# Patient Record
Sex: FEMALE | Race: WHITE | NOT HISPANIC OR LATINO | Employment: STUDENT | URBAN - METROPOLITAN AREA
[De-identification: names, ages, dates, MRNs, and addresses within clinical notes are randomized per-mention and may not be internally consistent; named-entity substitution may affect disease eponyms.]

---
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
## 2022-08-05 ENCOUNTER — OFFICE VISIT (OUTPATIENT)
Dept: INTERNAL MEDICINE | Facility: CLINIC | Age: 22
End: 2022-08-05
Payer: COMMERCIAL

## 2022-08-05 VITALS
WEIGHT: 165.56 LBS | SYSTOLIC BLOOD PRESSURE: 114 MMHG | HEART RATE: 82 BPM | OXYGEN SATURATION: 97 % | DIASTOLIC BLOOD PRESSURE: 74 MMHG | BODY MASS INDEX: 22.43 KG/M2 | HEIGHT: 72 IN

## 2022-08-05 DIAGNOSIS — F90.9 ATTENTION DEFICIT HYPERACTIVITY DISORDER (ADHD), UNSPECIFIED ADHD TYPE: ICD-10-CM

## 2022-08-05 DIAGNOSIS — Z00.00 ANNUAL PHYSICAL EXAM: Primary | ICD-10-CM

## 2022-08-05 DIAGNOSIS — K21.9 GASTROESOPHAGEAL REFLUX DISEASE, UNSPECIFIED WHETHER ESOPHAGITIS PRESENT: ICD-10-CM

## 2022-08-05 DIAGNOSIS — F32.5 MAJOR DEPRESSIVE DISORDER WITH SINGLE EPISODE, IN REMISSION: ICD-10-CM

## 2022-08-05 DIAGNOSIS — Z30.430 ENCOUNTER FOR INSERTION OF INTRAUTERINE CONTRACEPTIVE DEVICE (IUD): ICD-10-CM

## 2022-08-05 PROBLEM — F32.9 MAJOR DEPRESSIVE DISORDER, SINGLE EPISODE, UNSPECIFIED: Status: ACTIVE | Noted: 2021-07-03

## 2022-08-05 PROBLEM — F41.9 ANXIETY DISORDER, UNSPECIFIED: Status: ACTIVE | Noted: 2021-07-03

## 2022-08-05 PROCEDURE — 99385 PR PREVENTIVE VISIT,NEW,18-39: ICD-10-PCS | Mod: S$GLB,,, | Performed by: NURSE PRACTITIONER

## 2022-08-05 PROCEDURE — 1160F RVW MEDS BY RX/DR IN RCRD: CPT | Mod: CPTII,S$GLB,, | Performed by: NURSE PRACTITIONER

## 2022-08-05 PROCEDURE — 3078F DIAST BP <80 MM HG: CPT | Mod: CPTII,S$GLB,, | Performed by: NURSE PRACTITIONER

## 2022-08-05 PROCEDURE — 3074F SYST BP LT 130 MM HG: CPT | Mod: CPTII,S$GLB,, | Performed by: NURSE PRACTITIONER

## 2022-08-05 PROCEDURE — 3078F PR MOST RECENT DIASTOLIC BLOOD PRESSURE < 80 MM HG: ICD-10-PCS | Mod: CPTII,S$GLB,, | Performed by: NURSE PRACTITIONER

## 2022-08-05 PROCEDURE — 3074F PR MOST RECENT SYSTOLIC BLOOD PRESSURE < 130 MM HG: ICD-10-PCS | Mod: CPTII,S$GLB,, | Performed by: NURSE PRACTITIONER

## 2022-08-05 PROCEDURE — 99385 PREV VISIT NEW AGE 18-39: CPT | Mod: S$GLB,,, | Performed by: NURSE PRACTITIONER

## 2022-08-05 PROCEDURE — 1159F MED LIST DOCD IN RCRD: CPT | Mod: CPTII,S$GLB,, | Performed by: NURSE PRACTITIONER

## 2022-08-05 PROCEDURE — 3008F BODY MASS INDEX DOCD: CPT | Mod: CPTII,S$GLB,, | Performed by: NURSE PRACTITIONER

## 2022-08-05 PROCEDURE — 99999 PR PBB SHADOW E&M-EST. PATIENT-LVL IV: CPT | Mod: PBBFAC,,, | Performed by: NURSE PRACTITIONER

## 2022-08-05 PROCEDURE — 99999 PR PBB SHADOW E&M-EST. PATIENT-LVL IV: ICD-10-PCS | Mod: PBBFAC,,, | Performed by: NURSE PRACTITIONER

## 2022-08-05 PROCEDURE — 1160F PR REVIEW ALL MEDS BY PRESCRIBER/CLIN PHARMACIST DOCUMENTED: ICD-10-PCS | Mod: CPTII,S$GLB,, | Performed by: NURSE PRACTITIONER

## 2022-08-05 PROCEDURE — 3008F PR BODY MASS INDEX (BMI) DOCUMENTED: ICD-10-PCS | Mod: CPTII,S$GLB,, | Performed by: NURSE PRACTITIONER

## 2022-08-05 PROCEDURE — 1159F PR MEDICATION LIST DOCUMENTED IN MEDICAL RECORD: ICD-10-PCS | Mod: CPTII,S$GLB,, | Performed by: NURSE PRACTITIONER

## 2022-08-05 NOTE — PATIENT INSTRUCTIONS
I recommend the following Obstetrician and Gynecologists:  Dr. Charbonnet Dr. Schexnaildre Dr. VanWormer Dr. Jeansonne   Please call [phone] to scheduled an appointment

## 2022-08-05 NOTE — PROGRESS NOTES
Internal Medicine Annual Exam       CHIEF COMPLAINT     The patient, Bibiana Suero, who is a 21 y.o. female presents for an annual exam.    HPI   Here for annual and to establish care     Reflux- x 6 months with nasal burning and epigastric burning. Keeps her from sleeping. No melanea or blood in the stool.   No weight loss without trying.   Treating with omeprazole and tums with relief.     ADHD- not on medication currently     Anxiety and depression- treated with Lexapro antidepressant, not currently taking.      covid - presumptive positive 1/1/2022    -  covid- 5/23 and 6/13 - booster upcoming   tdap- UTD   Flu-utd  HPV- utd     Past Medical History:  Past Medical History:   Diagnosis Date    ADHD (attention deficit hyperactivity disorder)     Allergy     Anxiety     Depression        Past Surgical History:   Procedure Laterality Date    TONSILLECTOMY          Family History   Problem Relation Age of Onset    Gestational diabetes Mother     Hypertension Maternal Grandmother     Diabetes Maternal Grandmother     Heart disease Paternal Grandmother     Heart failure Paternal Grandmother         Social History     Socioeconomic History    Marital status: Single   Tobacco Use    Smoking status: Never Smoker    Smokeless tobacco: Never Used   Substance and Sexual Activity    Alcohol use: Yes     Alcohol/week: 1.0 standard drink     Types: 1 Glasses of wine per week     Comment: few times per year    Drug use: Not Currently     Types: Marijuana, Psilocybin     Comment: rarely    Sexual activity: Not Currently     Partners: Female, Male     Birth control/protection: Abstinence   Social History Narrative    Lives with mom and step dad. 1 dog and a bearded dragon. Just added a cat.  [employer]- going to be in 12th grade        Social History     Tobacco Use   Smoking Status Never Smoker   Smokeless Tobacco Never Used        Allergies as of 08/05/2022 - Reviewed 08/05/2022   Allergen Reaction Noted     Adhesive Blisters and Dermatitis 11/20/2021          Home Medications:  Prior to Admission medications    Not on File       Review of Systems:  Review of Systems   Constitutional: Positive for activity change (increased exercise ). Negative for appetite change, chills, fatigue and fever.   HENT: Negative for congestion, rhinorrhea, sinus pressure, sinus pain and sore throat.    Eyes: Negative for visual disturbance.   Respiratory: Negative for cough, shortness of breath and wheezing.    Cardiovascular: Negative for chest pain, palpitations and leg swelling.   Gastrointestinal: Negative for abdominal pain, blood in stool, constipation, diarrhea, nausea and vomiting.        GERD - see HPI      Genitourinary: Negative for difficulty urinating, dysuria, menstrual problem and urgency.   Musculoskeletal: Positive for arthralgias (joint pain when not exercises ) and myalgias (soreness in the muscle from exercising ). Negative for gait problem and joint swelling.   Skin: Negative for rash.   Neurological: Negative for dizziness, light-headedness and headaches.   Psychiatric/Behavioral: Positive for decreased concentration (has ADHD - but can focus on projects such as croceting ). Negative for sleep disturbance.       Health Maintainence:   Immunizations:  Health Maintenance       Date Due Completion Date    Hepatitis C Screening Never done ---    Lipid Panel Never done ---    HIV Screening Never done ---    TETANUS VACCINE Never done ---    COVID-19 Vaccine (3 - Booster for Pfizer series) 11/13/2021 6/13/2021    Influenza Vaccine (1) 09/01/2022 11/6/2021           PHYSICAL EXAM     /74 (BP Location: Right arm, Patient Position: Sitting, BP Method: Medium (Manual))   Pulse 82   Ht 6' (1.829 m)   Wt 75.1 kg (165 lb 9.1 oz)   LMP 08/02/2022 (Exact Date)   SpO2 97%   BMI 22.45 kg/m²  Body mass index is 22.45 kg/m².    Physical Exam  Vitals reviewed.   Constitutional:       Appearance: She is well-developed.   HENT:       Head: Normocephalic.      Right Ear: External ear normal.      Left Ear: External ear normal.      Nose: Nose normal.      Mouth/Throat:      Pharynx: No oropharyngeal exudate.   Eyes:      Pupils: Pupils are equal, round, and reactive to light.   Neck:      Thyroid: No thyromegaly.      Vascular: No JVD.      Trachea: No tracheal deviation.   Cardiovascular:      Rate and Rhythm: Normal rate and regular rhythm.      Heart sounds: Normal heart sounds. No murmur heard.    No friction rub. No gallop.   Pulmonary:      Effort: Pulmonary effort is normal. No respiratory distress.      Breath sounds: Normal breath sounds. No wheezing or rales.   Abdominal:      General: Bowel sounds are normal. There is no distension.      Palpations: Abdomen is soft.      Tenderness: There is no abdominal tenderness.   Musculoskeletal:         General: No tenderness. Normal range of motion.      Cervical back: Neck supple.   Lymphadenopathy:      Cervical: No cervical adenopathy.   Skin:     General: Skin is warm and dry.      Findings: No rash.   Neurological:      Mental Status: She is alert and oriented to person, place, and time.   Psychiatric:         Behavior: Behavior normal.         LABS     No results found for: LABA1C, HGBA1C  CMP  Sodium   Date Value Ref Range Status   01/13/2012 139 136 - 145 mmol/L Final     Potassium   Date Value Ref Range Status   01/13/2012 4.2 3.5 - 5.1 mmol/L Final     Chloride   Date Value Ref Range Status   01/13/2012 104 95 - 110 mmol/L Final     CO2   Date Value Ref Range Status   01/13/2012 24 23.0 - 29.0 mmol/L Final     Glucose   Date Value Ref Range Status   01/13/2012 109 70 - 110 mg/dl Final     BUN   Date Value Ref Range Status   01/13/2012 14 5 - 18 mg/dl Final     Creatinine   Date Value Ref Range Status   01/13/2012 0.5 0.5 - 1.4 mg/dl Final     Calcium   Date Value Ref Range Status   01/13/2012 9.7 8.7 - 10.5 mg/dl Final     Total Protein   Date Value Ref Range Status   01/13/2012  7.0 6.0 - 8.4 g/dL Final     Albumin   Date Value Ref Range Status   01/13/2012 4.8 (H) 3.2 - 4.7 g/dl Final     Total Bilirubin   Date Value Ref Range Status   01/13/2012 0.4 0.1 - 1.0 mg/dl Final     Comment:     For infants and newborns, interpretation of results should be based  on gestational age, weight and in agreement with clinical  observations.  .  Premature Infant recommended reference ranges:  Up to 24 hours.............<8.0 mg/dl  Up to 48 hours............<12.0 mg/dl  3-5 days..................<15.0 mg/dl  6-29 days.................<15.0 mg/dl     Alkaline Phosphatase   Date Value Ref Range Status   01/13/2012 280 86 - 315 U/L Final     AST   Date Value Ref Range Status   01/13/2012 18 10 - 40 U/L Final     ALT   Date Value Ref Range Status   01/13/2012 13 10 - 44 U/L Final     Anion Gap   Date Value Ref Range Status   01/13/2012 11 8 - 16 mmol/L Final     eGFR if    Date Value Ref Range Status   01/13/2012 >60 >60 mL/min Final     Comment:     Estimated glomerular filtration rate (eGFR) is normalized to an  average body surface area of 1.73 square meters.  The calculation  used to obtain the eGFR is the adjusted MDRD equation, which factors  patient sex, age, race, and creatinine result.  Since race is unknown  in our information system, the eGFR values for -American  and Non--American patients are given for each creatinine  result.     eGFR if non    Date Value Ref Range Status   01/13/2012 >60 >60 mL/min Final     No results found for: WBC, HGB, HCT, MCV, PLT  No results found for: CHOL  No results found for: HDL  No results found for: LDLCALC  No results found for: TRIG  No results found for: CHOLHDL  No results found for: TSH, P2EDRSL, Z0EPIEW, THYROIDAB    ASSESSMENT/PLAN     Bibiana Suero is a 21 y.o. female    Annual physical exam- All age and gender related screenings discussed   -     CBC Auto Differential; Future; Expected date: 08/05/2022  -      Comprehensive Metabolic Panel; Future; Expected date: 08/05/2022  -     Lipid Panel; Future; Expected date: 08/05/2022  -     TSH; Future; Expected date: 08/05/2022  -     Vitamin D; Future; Expected date: 08/05/2022  -     H. PYLORI ANTIBODY, IGG; Future; Expected date: 08/05/2022    Attention deficit hyperactivity disorder (ADHD), unspecified ADHD type- stable. Will monitor.     Major depressive disorder with single episode, in remission- stable. Will monitor     Gastroesophageal reflux disease, unspecified whether esophagitis present- discussed dietary changes. Check h. Pylori. Cont PPI    -     H. PYLORI ANTIBODY, IGG; Future; Expected date: 08/05/2022    Encounter for insertion of intrauterine contraceptive device (IUD)  -     Ambulatory referral/consult to Obstetrics / Gynecology; Future; Expected date: 08/12/2022    Follow up with PCP    Sheila BONILLA, JOSEFINA, FNP-c   Department of Internal Medicine - [facility]  10:13 AM

## 2022-08-06 ENCOUNTER — LAB VISIT (OUTPATIENT)
Dept: LAB | Facility: HOSPITAL | Age: 22
End: 2022-08-06
Payer: COMMERCIAL

## 2022-08-06 DIAGNOSIS — K21.9 GASTROESOPHAGEAL REFLUX DISEASE, UNSPECIFIED WHETHER ESOPHAGITIS PRESENT: ICD-10-CM

## 2022-08-06 DIAGNOSIS — Z00.00 ANNUAL PHYSICAL EXAM: ICD-10-CM

## 2022-08-06 LAB
25(OH)D3+25(OH)D2 SERPL-MCNC: 16 NG/ML (ref 30–96)
ALBUMIN SERPL BCP-MCNC: 4.1 G/DL (ref 3.5–5.2)
ALP SERPL-CCNC: 62 U/L (ref 55–135)
ALT SERPL W/O P-5'-P-CCNC: 10 U/L (ref 10–44)
ANION GAP SERPL CALC-SCNC: 10 MMOL/L (ref 8–16)
AST SERPL-CCNC: 13 U/L (ref 10–40)
BASOPHILS # BLD AUTO: 0.05 K/UL (ref 0–0.2)
BASOPHILS NFR BLD: 0.8 % (ref 0–1.9)
BILIRUB SERPL-MCNC: 0.5 MG/DL (ref 0.1–1)
BUN SERPL-MCNC: 10 MG/DL (ref 6–20)
CALCIUM SERPL-MCNC: 9.3 MG/DL (ref 8.7–10.5)
CHLORIDE SERPL-SCNC: 105 MMOL/L (ref 95–110)
CHOLEST SERPL-MCNC: 185 MG/DL (ref 120–199)
CHOLEST/HDLC SERPL: 3.9 {RATIO} (ref 2–5)
CO2 SERPL-SCNC: 24 MMOL/L (ref 23–29)
CREAT SERPL-MCNC: 0.7 MG/DL (ref 0.5–1.4)
DIFFERENTIAL METHOD: ABNORMAL
EOSINOPHIL # BLD AUTO: 0.3 K/UL (ref 0–0.5)
EOSINOPHIL NFR BLD: 4.2 % (ref 0–8)
ERYTHROCYTE [DISTWIDTH] IN BLOOD BY AUTOMATED COUNT: 11.6 % (ref 11.5–14.5)
EST. GFR  (NO RACE VARIABLE): >60 ML/MIN/1.73 M^2
GLUCOSE SERPL-MCNC: 100 MG/DL (ref 70–110)
HCT VFR BLD AUTO: 39.2 % (ref 37–48.5)
HDLC SERPL-MCNC: 47 MG/DL (ref 40–75)
HDLC SERPL: 25.4 % (ref 20–50)
HGB BLD-MCNC: 13.4 G/DL (ref 12–16)
IMM GRANULOCYTES # BLD AUTO: 0.01 K/UL (ref 0–0.04)
IMM GRANULOCYTES NFR BLD AUTO: 0.2 % (ref 0–0.5)
LDLC SERPL CALC-MCNC: 124.6 MG/DL (ref 63–159)
LYMPHOCYTES # BLD AUTO: 2.8 K/UL (ref 1–4.8)
LYMPHOCYTES NFR BLD: 44.9 % (ref 18–48)
MCH RBC QN AUTO: 32 PG (ref 27–31)
MCHC RBC AUTO-ENTMCNC: 34.2 G/DL (ref 32–36)
MCV RBC AUTO: 94 FL (ref 82–98)
MONOCYTES # BLD AUTO: 0.5 K/UL (ref 0.3–1)
MONOCYTES NFR BLD: 8.2 % (ref 4–15)
NEUTROPHILS # BLD AUTO: 2.6 K/UL (ref 1.8–7.7)
NEUTROPHILS NFR BLD: 41.7 % (ref 38–73)
NONHDLC SERPL-MCNC: 138 MG/DL
NRBC BLD-RTO: 0 /100 WBC
PLATELET # BLD AUTO: 269 K/UL (ref 150–450)
PMV BLD AUTO: 9.2 FL (ref 9.2–12.9)
POTASSIUM SERPL-SCNC: 3.9 MMOL/L (ref 3.5–5.1)
PROT SERPL-MCNC: 6.9 G/DL (ref 6–8.4)
RBC # BLD AUTO: 4.19 M/UL (ref 4–5.4)
SODIUM SERPL-SCNC: 139 MMOL/L (ref 136–145)
TRIGL SERPL-MCNC: 67 MG/DL (ref 30–150)
TSH SERPL DL<=0.005 MIU/L-ACNC: 1.47 UIU/ML (ref 0.4–4)
WBC # BLD AUTO: 6.13 K/UL (ref 3.9–12.7)

## 2022-08-06 PROCEDURE — 85025 COMPLETE CBC W/AUTO DIFF WBC: CPT | Performed by: NURSE PRACTITIONER

## 2022-08-06 PROCEDURE — 80061 LIPID PANEL: CPT | Performed by: NURSE PRACTITIONER

## 2022-08-06 PROCEDURE — 36415 COLL VENOUS BLD VENIPUNCTURE: CPT | Performed by: NURSE PRACTITIONER

## 2022-08-06 PROCEDURE — 80053 COMPREHEN METABOLIC PANEL: CPT | Performed by: NURSE PRACTITIONER

## 2022-08-06 PROCEDURE — 82306 VITAMIN D 25 HYDROXY: CPT | Performed by: NURSE PRACTITIONER

## 2022-08-06 PROCEDURE — 84443 ASSAY THYROID STIM HORMONE: CPT | Performed by: NURSE PRACTITIONER

## 2022-08-06 PROCEDURE — 86677 HELICOBACTER PYLORI ANTIBODY: CPT | Performed by: NURSE PRACTITIONER

## 2022-08-09 LAB — H PYLORI IGG SERPL QL IA: NEGATIVE

## 2022-08-09 RX ORDER — ERGOCALCIFEROL 1.25 MG/1
50000 CAPSULE ORAL
Qty: 12 CAPSULE | Refills: 0 | Status: SHIPPED | OUTPATIENT
Start: 2022-08-09 | End: 2022-10-27

## 2022-10-05 ENCOUNTER — LAB VISIT (OUTPATIENT)
Dept: LAB | Facility: HOSPITAL | Age: 22
End: 2022-10-05
Attending: STUDENT IN AN ORGANIZED HEALTH CARE EDUCATION/TRAINING PROGRAM
Payer: COMMERCIAL

## 2022-10-05 ENCOUNTER — OFFICE VISIT (OUTPATIENT)
Dept: OBSTETRICS AND GYNECOLOGY | Facility: CLINIC | Age: 22
End: 2022-10-05
Payer: COMMERCIAL

## 2022-10-05 VITALS
WEIGHT: 163.13 LBS | DIASTOLIC BLOOD PRESSURE: 73 MMHG | BODY MASS INDEX: 22.13 KG/M2 | SYSTOLIC BLOOD PRESSURE: 115 MMHG

## 2022-10-05 DIAGNOSIS — Z11.3 ROUTINE SCREENING FOR STI (SEXUALLY TRANSMITTED INFECTION): ICD-10-CM

## 2022-10-05 DIAGNOSIS — Z01.419 WELL WOMAN EXAM WITH ROUTINE GYNECOLOGICAL EXAM: Primary | ICD-10-CM

## 2022-10-05 LAB
HBV SURFACE AG SERPL QL IA: NORMAL
HCV AB SERPL QL IA: NORMAL
HIV 1+2 AB+HIV1 P24 AG SERPL QL IA: NORMAL

## 2022-10-05 PROCEDURE — 1159F MED LIST DOCD IN RCRD: CPT | Mod: CPTII,S$GLB,, | Performed by: STUDENT IN AN ORGANIZED HEALTH CARE EDUCATION/TRAINING PROGRAM

## 2022-10-05 PROCEDURE — 86803 HEPATITIS C AB TEST: CPT | Performed by: STUDENT IN AN ORGANIZED HEALTH CARE EDUCATION/TRAINING PROGRAM

## 2022-10-05 PROCEDURE — 3078F PR MOST RECENT DIASTOLIC BLOOD PRESSURE < 80 MM HG: ICD-10-PCS | Mod: CPTII,S$GLB,, | Performed by: STUDENT IN AN ORGANIZED HEALTH CARE EDUCATION/TRAINING PROGRAM

## 2022-10-05 PROCEDURE — 81514 NFCT DS BV&VAGINITIS DNA ALG: CPT | Performed by: STUDENT IN AN ORGANIZED HEALTH CARE EDUCATION/TRAINING PROGRAM

## 2022-10-05 PROCEDURE — 87389 HIV-1 AG W/HIV-1&-2 AB AG IA: CPT | Performed by: STUDENT IN AN ORGANIZED HEALTH CARE EDUCATION/TRAINING PROGRAM

## 2022-10-05 PROCEDURE — 87491 CHLMYD TRACH DNA AMP PROBE: CPT | Performed by: STUDENT IN AN ORGANIZED HEALTH CARE EDUCATION/TRAINING PROGRAM

## 2022-10-05 PROCEDURE — 1159F PR MEDICATION LIST DOCUMENTED IN MEDICAL RECORD: ICD-10-PCS | Mod: CPTII,S$GLB,, | Performed by: STUDENT IN AN ORGANIZED HEALTH CARE EDUCATION/TRAINING PROGRAM

## 2022-10-05 PROCEDURE — 36415 COLL VENOUS BLD VENIPUNCTURE: CPT | Performed by: STUDENT IN AN ORGANIZED HEALTH CARE EDUCATION/TRAINING PROGRAM

## 2022-10-05 PROCEDURE — 3078F DIAST BP <80 MM HG: CPT | Mod: CPTII,S$GLB,, | Performed by: STUDENT IN AN ORGANIZED HEALTH CARE EDUCATION/TRAINING PROGRAM

## 2022-10-05 PROCEDURE — 99385 PR PREVENTIVE VISIT,NEW,18-39: ICD-10-PCS | Mod: S$GLB,,, | Performed by: STUDENT IN AN ORGANIZED HEALTH CARE EDUCATION/TRAINING PROGRAM

## 2022-10-05 PROCEDURE — 99999 PR PBB SHADOW E&M-EST. PATIENT-LVL III: ICD-10-PCS | Mod: PBBFAC,,, | Performed by: STUDENT IN AN ORGANIZED HEALTH CARE EDUCATION/TRAINING PROGRAM

## 2022-10-05 PROCEDURE — 3074F PR MOST RECENT SYSTOLIC BLOOD PRESSURE < 130 MM HG: ICD-10-PCS | Mod: CPTII,S$GLB,, | Performed by: STUDENT IN AN ORGANIZED HEALTH CARE EDUCATION/TRAINING PROGRAM

## 2022-10-05 PROCEDURE — 87340 HEPATITIS B SURFACE AG IA: CPT | Performed by: STUDENT IN AN ORGANIZED HEALTH CARE EDUCATION/TRAINING PROGRAM

## 2022-10-05 PROCEDURE — 87591 N.GONORRHOEAE DNA AMP PROB: CPT | Performed by: STUDENT IN AN ORGANIZED HEALTH CARE EDUCATION/TRAINING PROGRAM

## 2022-10-05 PROCEDURE — 86592 SYPHILIS TEST NON-TREP QUAL: CPT | Performed by: STUDENT IN AN ORGANIZED HEALTH CARE EDUCATION/TRAINING PROGRAM

## 2022-10-05 PROCEDURE — 3074F SYST BP LT 130 MM HG: CPT | Mod: CPTII,S$GLB,, | Performed by: STUDENT IN AN ORGANIZED HEALTH CARE EDUCATION/TRAINING PROGRAM

## 2022-10-05 PROCEDURE — 99999 PR PBB SHADOW E&M-EST. PATIENT-LVL III: CPT | Mod: PBBFAC,,, | Performed by: STUDENT IN AN ORGANIZED HEALTH CARE EDUCATION/TRAINING PROGRAM

## 2022-10-05 PROCEDURE — 3008F PR BODY MASS INDEX (BMI) DOCUMENTED: ICD-10-PCS | Mod: CPTII,S$GLB,, | Performed by: STUDENT IN AN ORGANIZED HEALTH CARE EDUCATION/TRAINING PROGRAM

## 2022-10-05 PROCEDURE — 99385 PREV VISIT NEW AGE 18-39: CPT | Mod: S$GLB,,, | Performed by: STUDENT IN AN ORGANIZED HEALTH CARE EDUCATION/TRAINING PROGRAM

## 2022-10-05 PROCEDURE — 3008F BODY MASS INDEX DOCD: CPT | Mod: CPTII,S$GLB,, | Performed by: STUDENT IN AN ORGANIZED HEALTH CARE EDUCATION/TRAINING PROGRAM

## 2022-10-05 NOTE — PROGRESS NOTES
"CC: Well woman exam    HPI:  Bibiana Suero is a 22 y.o. female  presents for a well woman exam.  She has no issues, problems, or complaints. Patient reports they are transgender, goes by "Glory" and pronouns are all - does not mind she/he/they. Patient is going to have a new sexual partner and desires STI testing to be sure status before engaging in sexual activity. Interested in breast reduction in future.        Patient history:   Past Medical History:   Diagnosis Date    ADHD (attention deficit hyperactivity disorder)     Allergy     Anxiety     Depression     Other infectious mononucleosis without complication      Past Surgical History:   Procedure Laterality Date    TONSILLECTOMY       OB History    Para Term  AB Living   0 0 0 0 0 0   SAB IAB Ectopic Multiple Live Births   0 0 0 0 0       GYN  Menopausal: No  History of abnormal paps: N/A  Abnormal or postmenopausal bleeding: DENIES  History of abnormal mammograms:N/A   Family history of breast or ovarian cancer: DENIES  Any breast masses, pain, skin changes, or nipple discharge: DENIES  Possible recent STD exposure: denies   Contraception: None    Pap: No result found, <21  Mammogram: N/A      Family History   Problem Relation Age of Onset    Gestational diabetes Mother     Hypertension Maternal Grandmother     Diabetes Maternal Grandmother     Heart disease Paternal Grandmother     Heart failure Paternal Grandmother      Social History     Tobacco Use    Smoking status: Never    Smokeless tobacco: Never   Substance Use Topics    Alcohol use: Yes     Alcohol/week: 1.0 standard drink     Types: 1 Glasses of wine per week     Comment: few times per year    Drug use: Not Currently     Types: Marijuana, Psilocybin     Comment: rarely     Allergies: Adhesive    Current Outpatient Medications:     ergocalciferol (ERGOCALCIFEROL) 50,000 unit Cap, Take 1 capsule (50,000 Units total) by mouth every 7 days., Disp: 12 capsule, Rfl: 0 "       ROS:  GENERAL: Denies weight gain or weight loss. Feeling well overall.   SKIN: Denies rash or lesions.   HEAD: Denies head injury or headache.   NODES: Denies enlarged lymph nodes.   CHEST: Denies chest pain or shortness of breath.   CARDIOVASCULAR: Denies palpitations or left sided chest pain.   ABDOMEN: No abdominal pain, constipation, diarrhea, nausea, vomiting or rectal bleeding.   URINARY: No frequency, dysuria, hematuria, or burning on urination.  REPRODUCTIVE: See HPI.   BREASTS: The patient performs breast self-examination and denies pain, lumps, or nipple discharge.   HEMATOLOGIC: No easy bruisability or excessive bleeding.  MUSCULOSKELETAL: Denies joint pain or swelling.   NEUROLOGIC: Denies syncope or weakness.   PSYCHIATRIC: Denies depression, anxiety or mood swings.    Objective:   /73   Wt 74 kg (163 lb 2.3 oz)   LMP 09/26/2022 (Exact Date)   BMI 22.13 kg/m²       Physical Exam:  APPEARANCE: Well nourished, well developed, in no acute distress.  AFFECT: WNL, alert and oriented x 3  SKIN: No acne or hirsutism  NECK: Neck symmetric without masses or thyromegaly  NODES: No inguinal, cervical, axillary, or femoral lymph node enlargement  CHEST: Good respiratory effect  ABDOMEN: Soft.  No tenderness or masses.  No hepatosplenomegaly.  No hernias.  BREASTS: Symmetrical, no skin changes or visible lesions.  No palpable masses, nipple discharge bilaterally.  PELVIC: Normal external genitalia without lesions.  Normal hair distribution.  Adequate perineal body, normal urethral meatus.  Vagina moist and well rugated without lesions or discharge.  Cervix pink, without lesions, discharge or tenderness.  No significant cystocele or rectocele.    EXTREMITIES: No edema.    ASSESSMENT AND PLAN  1. Well woman exam with routine gynecological exam  Device Authorization Order    Ambulatory referral/consult to Plastic Surgery      2. Routine screening for STI (sexually transmitted infection)  C.  trachomatis/N. gonorrhoeae by AMP DNA [facility]; Vagina    Hepatitis B Surface Antigen    Hepatitis C Antibody    HIV 1/2 Ag/Ab (4th Gen)    RPR    Vaginosis Screen by DNA Probe          Annual exam  Breast and pelvic exam: wnl  Patient counseled on ASCCP guidelines for cervical cytology screening  Cervical screening: at 21  HPV vaccine: completed 2013-15  Patient counseled on current recommendations for breast cancer screening  Mammogram screening: at 40  STD testing: completed today per patient request  Contraception: patient desires copper IUD, has done own research. Reviewed risks/benefits today, patient confirms desire for placement. IUD device ordered, will schedule placement once approved.  Osteoporosis screening at 65  Tobacco cessation counseling n/a  Referral to plastic surgery for more information regarding future breast reduction     She was counseled to follow up with her PCP for other routine health maintenance      Follow up for for IUD insertion .      Hanna Monique MD  OBGYN [facility]

## 2022-10-06 LAB — RPR SER QL: NORMAL

## 2022-10-07 LAB
BACTERIAL VAGINOSIS DNA: POSITIVE
C TRACH DNA SPEC QL NAA+PROBE: NOT DETECTED
CANDIDA GLABRATA DNA: NEGATIVE
CANDIDA KRUSEI DNA: NEGATIVE
CANDIDA RRNA VAG QL PROBE: NEGATIVE
N GONORRHOEA DNA SPEC QL NAA+PROBE: NOT DETECTED
T VAGINALIS RRNA GENITAL QL PROBE: NEGATIVE

## 2022-10-08 ENCOUNTER — PATIENT MESSAGE (OUTPATIENT)
Dept: OBSTETRICS AND GYNECOLOGY | Facility: CLINIC | Age: 22
End: 2022-10-08
Payer: COMMERCIAL

## 2022-10-10 DIAGNOSIS — B96.89 BV (BACTERIAL VAGINOSIS): Primary | ICD-10-CM

## 2022-10-10 DIAGNOSIS — N76.0 BV (BACTERIAL VAGINOSIS): Primary | ICD-10-CM

## 2022-10-10 RX ORDER — METRONIDAZOLE 500 MG/1
500 TABLET ORAL EVERY 12 HOURS
Qty: 14 TABLET | Refills: 0 | Status: SHIPPED | OUTPATIENT
Start: 2022-10-10 | End: 2022-10-17

## 2022-10-28 ENCOUNTER — IMMUNIZATION (OUTPATIENT)
Dept: INTERNAL MEDICINE | Facility: CLINIC | Age: 22
End: 2022-10-28
Payer: COMMERCIAL

## 2022-10-28 DIAGNOSIS — Z23 NEED FOR VACCINATION: Primary | ICD-10-CM

## 2022-10-28 PROCEDURE — 91312 COVID-19, MRNA, LNP-S, BIVALENT BOOSTER, PF, 30 MCG/0.3 ML DOSE: CPT | Mod: S$GLB,,, | Performed by: INTERNAL MEDICINE

## 2022-10-28 PROCEDURE — 91312 COVID-19, MRNA, LNP-S, BIVALENT BOOSTER, PF, 30 MCG/0.3 ML DOSE: ICD-10-PCS | Mod: S$GLB,,, | Performed by: INTERNAL MEDICINE

## 2022-10-28 PROCEDURE — 0124A COVID-19, MRNA, LNP-S, BIVALENT BOOSTER, PF, 30 MCG/0.3 ML DOSE: CPT | Mod: CV19,PBBFAC | Performed by: INTERNAL MEDICINE

## 2022-11-05 ENCOUNTER — IMMUNIZATION (OUTPATIENT)
Dept: INTERNAL MEDICINE | Facility: CLINIC | Age: 22
End: 2022-11-05
Payer: COMMERCIAL

## 2022-11-05 PROCEDURE — 90471 FLU VACCINE (QUAD) GREATER THAN OR EQUAL TO 3YO PRESERVATIVE FREE IM: ICD-10-PCS | Mod: S$GLB,,, | Performed by: INTERNAL MEDICINE

## 2022-11-05 PROCEDURE — 90471 IMMUNIZATION ADMIN: CPT | Mod: S$GLB,,, | Performed by: INTERNAL MEDICINE

## 2022-11-05 PROCEDURE — 90686 FLU VACCINE (QUAD) GREATER THAN OR EQUAL TO 3YO PRESERVATIVE FREE IM: ICD-10-PCS | Mod: S$GLB,,, | Performed by: INTERNAL MEDICINE

## 2022-11-05 PROCEDURE — 90686 IIV4 VACC NO PRSV 0.5 ML IM: CPT | Mod: S$GLB,,, | Performed by: INTERNAL MEDICINE

## 2022-11-17 ENCOUNTER — OFFICE VISIT (OUTPATIENT)
Dept: PLASTIC SURGERY | Facility: CLINIC | Age: 22
End: 2022-11-17
Payer: COMMERCIAL

## 2022-11-17 VITALS
DIASTOLIC BLOOD PRESSURE: 63 MMHG | HEIGHT: 72 IN | OXYGEN SATURATION: 98 % | WEIGHT: 169.75 LBS | BODY MASS INDEX: 22.99 KG/M2 | SYSTOLIC BLOOD PRESSURE: 105 MMHG | HEART RATE: 92 BPM

## 2022-11-17 DIAGNOSIS — F64.9 GENDER DYSPHORIA: ICD-10-CM

## 2022-11-17 DIAGNOSIS — G89.29 CHRONIC BILATERAL THORACIC BACK PAIN: ICD-10-CM

## 2022-11-17 DIAGNOSIS — M54.6 CHRONIC BILATERAL THORACIC BACK PAIN: ICD-10-CM

## 2022-11-17 DIAGNOSIS — N62 MACROMASTIA: Primary | ICD-10-CM

## 2022-11-17 PROCEDURE — 1159F MED LIST DOCD IN RCRD: CPT | Mod: CPTII,S$GLB,, | Performed by: SURGERY

## 2022-11-17 PROCEDURE — 3074F PR MOST RECENT SYSTOLIC BLOOD PRESSURE < 130 MM HG: ICD-10-PCS | Mod: CPTII,S$GLB,, | Performed by: SURGERY

## 2022-11-17 PROCEDURE — 3008F PR BODY MASS INDEX (BMI) DOCUMENTED: ICD-10-PCS | Mod: CPTII,S$GLB,, | Performed by: SURGERY

## 2022-11-17 PROCEDURE — 99999 PR PBB SHADOW E&M-EST. PATIENT-LVL III: CPT | Mod: PBBFAC,,, | Performed by: SURGERY

## 2022-11-17 PROCEDURE — 3078F PR MOST RECENT DIASTOLIC BLOOD PRESSURE < 80 MM HG: ICD-10-PCS | Mod: CPTII,S$GLB,, | Performed by: SURGERY

## 2022-11-17 PROCEDURE — 99999 PR PBB SHADOW E&M-EST. PATIENT-LVL III: ICD-10-PCS | Mod: PBBFAC,,, | Performed by: SURGERY

## 2022-11-17 PROCEDURE — 1159F PR MEDICATION LIST DOCUMENTED IN MEDICAL RECORD: ICD-10-PCS | Mod: CPTII,S$GLB,, | Performed by: SURGERY

## 2022-11-17 PROCEDURE — 3078F DIAST BP <80 MM HG: CPT | Mod: CPTII,S$GLB,, | Performed by: SURGERY

## 2022-11-17 PROCEDURE — 3074F SYST BP LT 130 MM HG: CPT | Mod: CPTII,S$GLB,, | Performed by: SURGERY

## 2022-11-17 PROCEDURE — 99204 PR OFFICE/OUTPT VISIT, NEW, LEVL IV, 45-59 MIN: ICD-10-PCS | Mod: S$GLB,,, | Performed by: SURGERY

## 2022-11-17 PROCEDURE — 3008F BODY MASS INDEX DOCD: CPT | Mod: CPTII,S$GLB,, | Performed by: SURGERY

## 2022-11-17 PROCEDURE — 99204 OFFICE O/P NEW MOD 45 MIN: CPT | Mod: S$GLB,,, | Performed by: SURGERY

## 2022-11-20 PROBLEM — G89.29 CHRONIC BILATERAL THORACIC BACK PAIN: Status: ACTIVE | Noted: 2022-11-20

## 2022-11-20 PROBLEM — F64.9 GENDER DYSPHORIA: Status: ACTIVE | Noted: 2022-11-20

## 2022-11-20 PROBLEM — N62 MACROMASTIA: Status: ACTIVE | Noted: 2022-11-20

## 2022-11-20 PROBLEM — M54.6 CHRONIC BILATERAL THORACIC BACK PAIN: Status: ACTIVE | Noted: 2022-11-20

## 2022-11-20 NOTE — H&P (VIEW-ONLY)
"Plastic Surgery History & Physical    SUBJECTIVE:   Chief complaint: large breasts, pain    History of Present Illness:  Patient is a 22 y.o. female presents with symptomatic bilateral large pendulous  breasts. She is 34DDD. States that she has back and neck pain for greater than 5 years.  Her pain is also worse during her menstrual cycles when her breasts swell.  Takes :"tylenol NSAIDS for this. She has not participated in physical therapy and exercise program to relieve back and neck pain. Complaints of shoulder grooving from queta straps and rashes under the breast She is active.  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  She is a nonsmoker, does not use any nicotine products.  Past medical history includes vitamin-D deficiency, ADHD  She identifies as non binary.  She knows that she is not present very masculine and she is fluid with her pronouns.  She currently does not bind her chest because her breasts are too large and is uncomfortable.  She does think if her breasts are smaller she would be more likely to bind.  She is considered mastectomy for [facility] top surgery, though she is not sure if she is ready to have her breasts removed., and depression.    Past Medical History:   Diagnosis Date    ADHD (attention deficit hyperactivity disorder)     Allergy     Anxiety     Depression     Other infectious mononucleosis without complication        Past Surgical History:   Procedure Laterality Date    TONSILLECTOMY         Family History   Problem Relation Age of Onset    Gestational diabetes Mother     Hypertension Maternal Grandmother     Diabetes Maternal Grandmother     Heart disease Paternal Grandmother     Heart failure Paternal Grandmother        Social History     Socioeconomic History    Marital status: Single   Tobacco Use    Smoking status: Never    Smokeless tobacco: Never   Substance and Sexual Activity    Alcohol use: Yes     Alcohol/week: 1.0 standard drink     " Types: 1 Glasses of wine per week     Comment: few times per year    Drug use: Not Currently     Types: Marijuana, Psilocybin     Comment: rarely    Sexual activity: Not Currently     Partners: Female, Male     Birth control/protection: Abstinence   Social History Narrative    Lives with mom and step dad. 1 dog and a bearded dragon. Just added a cat.  [employer]- going to be in 12th grade       Current Outpatient Medications   Medication Sig Dispense Refill    ergocalciferol (ERGOCALCIFEROL) 50,000 unit Cap TAKE 1 CAPSULE BY MOUTH ONE TIME PER WEEK 12 capsule 0     No current facility-administered medications for this visit.       Review of patient's allergies indicates:   Allergen Reactions    Adhesive Blisters and Dermatitis         Review of Systems:    Review of Systems   HENT: Positive for neck pain.    Musculoskeletal: Positive for back pain.         OBJECTIVE:     /63 (BP Location: Left arm, Patient Position: Sitting, BP Method: Medium (Automatic))   Pulse 92   Ht 6' (1.829 m)   Wt 77 kg (169 lb 12.1 oz)   LMP 10/24/2022   SpO2 98%   BMI 23.02 kg/m²     Physical Exam:  Gen: NAD, appears stated age  Neuro: normal without focal findings, mental status and speech normal  HEENT: NCAT, neck supple, PEERL  CV: RRR  Pulm: Breathing non-labored, chest wall movement equal bilaterally   Breast: Large heavy breasts, Grade 1 Ptosis  Abdomen: soft, nontender, no guarding  Gu: genitalia not examined  Extremity:normal strength, no cyanosis or edema  Skin: Skin color, texture, turgor normal. No rashes or lesions  Psych: oriented to time, place and person, mood and affect are within normal limits       Last MMG NA    ASSESSMENT/PLAN:     Macromastia  Patient is a good candidate for bilateral breast reduction.  I expect an approximate 400gm reduction per side.  Discussed general guidelines for insurance approval for breast reductions.     Discussed the procedure in detail including: using a pedicle of breast  tissue to support blood supply to the nipple, the use of Wise pattern skin flaps to reduce size and support the pedicle, risks of skin loss, fat necrosis, partial or complete nipple loss, delayed wound healing, normal scarring, outpatient surgery, general recovery and the use of drains. Also discussed that breasts may be bruised or swollen afterwards and won't have their final appearance for 3-6 months after surgery. Discussed slightly reduced chance of successful breast feeding.    All questions answered.    Will send for photos and submit for insurance approval      Chronic bilateral thoracic back pain  See macromastia      Gender dysphoria  See macromastia  Will bind if her breasts are smaller      Leodan Loyd, DO  Plastic and Reconstructive Surgery

## 2022-11-20 NOTE — PROGRESS NOTES
"Plastic Surgery History & Physical    SUBJECTIVE:   Chief complaint: large breasts, pain    History of Present Illness:  Patient is a 22 y.o. female presents with symptomatic bilateral large pendulous  breasts. She is 34DDD. States that she has back and neck pain for greater than 5 years.  Her pain is also worse during her menstrual cycles when her breasts swell.  Takes :"tylenol NSAIDS for this. She has not participated in physical therapy and exercise program to relieve back and neck pain. Complaints of shoulder grooving from queta straps and rashes under the breast She is active.  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  She is a nonsmoker, does not use any nicotine products.  Past medical history includes vitamin-D deficiency, ADHD  She identifies as non binary.  She knows that she is not present very masculine and she is fluid with her pronouns.  She currently does not bind her chest because her breasts are too large and is uncomfortable.  She does think if her breasts are smaller she would be more likely to bind.  She is considered mastectomy for [facility] top surgery, though she is not sure if she is ready to have her breasts removed., and depression.    Past Medical History:   Diagnosis Date    ADHD (attention deficit hyperactivity disorder)     Allergy     Anxiety     Depression     Other infectious mononucleosis without complication        Past Surgical History:   Procedure Laterality Date    TONSILLECTOMY         Family History   Problem Relation Age of Onset    Gestational diabetes Mother     Hypertension Maternal Grandmother     Diabetes Maternal Grandmother     Heart disease Paternal Grandmother     Heart failure Paternal Grandmother        Social History     Socioeconomic History    Marital status: Single   Tobacco Use    Smoking status: Never    Smokeless tobacco: Never   Substance and Sexual Activity    Alcohol use: Yes     Alcohol/week: 1.0 standard drink     " Types: 1 Glasses of wine per week     Comment: few times per year    Drug use: Not Currently     Types: Marijuana, Psilocybin     Comment: rarely    Sexual activity: Not Currently     Partners: Female, Male     Birth control/protection: Abstinence   Social History Narrative    Lives with mom and step dad. 1 dog and a bearded dragon. Just added a cat.  [employer]- going to be in 12th grade       Current Outpatient Medications   Medication Sig Dispense Refill    ergocalciferol (ERGOCALCIFEROL) 50,000 unit Cap TAKE 1 CAPSULE BY MOUTH ONE TIME PER WEEK 12 capsule 0     No current facility-administered medications for this visit.       Review of patient's allergies indicates:   Allergen Reactions    Adhesive Blisters and Dermatitis         Review of Systems:    Review of Systems   HENT: Positive for neck pain.    Musculoskeletal: Positive for back pain.         OBJECTIVE:     /63 (BP Location: Left arm, Patient Position: Sitting, BP Method: Medium (Automatic))   Pulse 92   Ht 6' (1.829 m)   Wt 77 kg (169 lb 12.1 oz)   LMP 10/24/2022   SpO2 98%   BMI 23.02 kg/m²     Physical Exam:  Gen: NAD, appears stated age  Neuro: normal without focal findings, mental status and speech normal  HEENT: NCAT, neck supple, PEERL  CV: RRR  Pulm: Breathing non-labored, chest wall movement equal bilaterally   Breast: Large heavy breasts, Grade 1 Ptosis  Abdomen: soft, nontender, no guarding  Gu: genitalia not examined  Extremity:normal strength, no cyanosis or edema  Skin: Skin color, texture, turgor normal. No rashes or lesions  Psych: oriented to time, place and person, mood and affect are within normal limits       Last MMG NA    ASSESSMENT/PLAN:     Macromastia  Patient is a good candidate for bilateral breast reduction.  I expect an approximate 400gm reduction per side.  Discussed general guidelines for insurance approval for breast reductions.     Discussed the procedure in detail including: using a pedicle of breast  tissue to support blood supply to the nipple, the use of Wise pattern skin flaps to reduce size and support the pedicle, risks of skin loss, fat necrosis, partial or complete nipple loss, delayed wound healing, normal scarring, outpatient surgery, general recovery and the use of drains. Also discussed that breasts may be bruised or swollen afterwards and won't have their final appearance for 3-6 months after surgery. Discussed slightly reduced chance of successful breast feeding.    All questions answered.    Will send for photos and submit for insurance approval      Chronic bilateral thoracic back pain  See macromastia      Gender dysphoria  See macromastia  Will bind if her breasts are smaller      Leodan Loyd, DO  Plastic and Reconstructive Surgery

## 2022-11-20 NOTE — ASSESSMENT & PLAN NOTE
Patient is a good candidate for bilateral breast reduction.  I expect an approximate 400gm reduction per side.  Discussed general guidelines for insurance approval for breast reductions.     Discussed the procedure in detail including: using a pedicle of breast tissue to support blood supply to the nipple, the use of Wise pattern skin flaps to reduce size and support the pedicle, risks of skin loss, fat necrosis, partial or complete nipple loss, delayed wound healing, normal scarring, outpatient surgery, general recovery and the use of drains. Also discussed that breasts may be bruised or swollen afterwards and won't have their final appearance for 3-6 months after surgery. Discussed slightly reduced chance of successful breast feeding.    All questions answered.    Will send for photos and submit for insurance approval

## 2022-12-02 ENCOUNTER — TELEPHONE (OUTPATIENT)
Dept: PLASTIC SURGERY | Facility: CLINIC | Age: 22
End: 2022-12-02
Payer: COMMERCIAL

## 2022-12-02 DIAGNOSIS — N62 MACROMASTIA: Primary | ICD-10-CM

## 2022-12-02 NOTE — TELEPHONE ENCOUNTER
Pt called and confirmed sx date 12/9- advised pt of pre admit /anesthesia appt along with labs to be done. Direct # given to call to make appt- pt agreeable and verbalized understanding. No add'l concerns at this time.

## 2022-12-02 NOTE — TELEPHONE ENCOUNTER
Marcia pt to offer sx dates in December with Dr Loyd. Pt will call by the end of today to let me know. No additional concerns addressed at this time.

## 2022-12-07 ENCOUNTER — HOSPITAL ENCOUNTER (OUTPATIENT)
Dept: PREADMISSION TESTING | Facility: OTHER | Age: 22
Discharge: HOME | End: 2022-12-07
Attending: SURGERY
Payer: COMMERCIAL

## 2022-12-07 ENCOUNTER — ANESTHESIA EVENT (OUTPATIENT)
Dept: SURGERY | Facility: OTHER | Age: 22
End: 2022-12-07
Payer: COMMERCIAL

## 2022-12-07 VITALS
SYSTOLIC BLOOD PRESSURE: 92 MMHG | BODY MASS INDEX: 22.89 KG/M2 | HEART RATE: 67 BPM | WEIGHT: 169 LBS | TEMPERATURE: 98 F | DIASTOLIC BLOOD PRESSURE: 51 MMHG | HEIGHT: 72 IN | OXYGEN SATURATION: 99 % | RESPIRATION RATE: 16 BRPM

## 2022-12-07 DIAGNOSIS — N62 MACROMASTIA: ICD-10-CM

## 2022-12-07 LAB
ALBUMIN SERPL BCP-MCNC: 4.5 G/DL (ref 3.5–5.2)
ALP SERPL-CCNC: 62 U/L (ref 55–135)
ALT SERPL W/O P-5'-P-CCNC: 13 U/L (ref 10–44)
ANION GAP SERPL CALC-SCNC: 8 MMOL/L (ref 8–16)
AST SERPL-CCNC: 11 U/L (ref 10–40)
BASOPHILS # BLD AUTO: 0.05 K/UL (ref 0–0.2)
BASOPHILS NFR BLD: 0.8 % (ref 0–1.9)
BILIRUB SERPL-MCNC: 0.6 MG/DL (ref 0.1–1)
BUN SERPL-MCNC: 11 MG/DL (ref 6–20)
CALCIUM SERPL-MCNC: 9.5 MG/DL (ref 8.7–10.5)
CHLORIDE SERPL-SCNC: 110 MMOL/L (ref 95–110)
CO2 SERPL-SCNC: 22 MMOL/L (ref 23–29)
CREAT SERPL-MCNC: 0.7 MG/DL (ref 0.5–1.4)
DIFFERENTIAL METHOD: ABNORMAL
EOSINOPHIL # BLD AUTO: 0.2 K/UL (ref 0–0.5)
EOSINOPHIL NFR BLD: 3.9 % (ref 0–8)
ERYTHROCYTE [DISTWIDTH] IN BLOOD BY AUTOMATED COUNT: 11.5 % (ref 11.5–14.5)
EST. GFR  (NO RACE VARIABLE): >60 ML/MIN/1.73 M^2
GLUCOSE SERPL-MCNC: 108 MG/DL (ref 70–110)
HCT VFR BLD AUTO: 40.3 % (ref 37–48.5)
HGB BLD-MCNC: 14.3 G/DL (ref 12–16)
IMM GRANULOCYTES # BLD AUTO: 0.02 K/UL (ref 0–0.04)
IMM GRANULOCYTES NFR BLD AUTO: 0.3 % (ref 0–0.5)
LYMPHOCYTES # BLD AUTO: 2.3 K/UL (ref 1–4.8)
LYMPHOCYTES NFR BLD: 39.2 % (ref 18–48)
MCH RBC QN AUTO: 32.9 PG (ref 27–31)
MCHC RBC AUTO-ENTMCNC: 35.5 G/DL (ref 32–36)
MCV RBC AUTO: 93 FL (ref 82–98)
MONOCYTES # BLD AUTO: 0.5 K/UL (ref 0.3–1)
MONOCYTES NFR BLD: 8.4 % (ref 4–15)
NEUTROPHILS # BLD AUTO: 2.8 K/UL (ref 1.8–7.7)
NEUTROPHILS NFR BLD: 47.4 % (ref 38–73)
NRBC BLD-RTO: 0 /100 WBC
PLATELET # BLD AUTO: 246 K/UL (ref 150–450)
PMV BLD AUTO: 9.1 FL (ref 9.2–12.9)
POTASSIUM SERPL-SCNC: 4.2 MMOL/L (ref 3.5–5.1)
PROT SERPL-MCNC: 7.1 G/DL (ref 6–8.4)
RBC # BLD AUTO: 4.35 M/UL (ref 4–5.4)
SODIUM SERPL-SCNC: 140 MMOL/L (ref 136–145)
WBC # BLD AUTO: 5.92 K/UL (ref 3.9–12.7)

## 2022-12-07 PROCEDURE — 85025 COMPLETE CBC W/AUTO DIFF WBC: CPT | Performed by: SURGERY

## 2022-12-07 PROCEDURE — 80053 COMPREHEN METABOLIC PANEL: CPT | Performed by: SURGERY

## 2022-12-07 PROCEDURE — 36415 COLL VENOUS BLD VENIPUNCTURE: CPT | Performed by: SURGERY

## 2022-12-08 ENCOUNTER — TELEPHONE (OUTPATIENT)
Dept: PLASTIC SURGERY | Facility: CLINIC | Age: 22
End: 2022-12-08
Payer: COMMERCIAL

## 2022-12-08 NOTE — TELEPHONE ENCOUNTER
Pre op call completed. Ptadvised  to arrive at 0630 for 0830 surgery At [facility] location 1st floor Pre admit- NPO verified along with ride and caregiver. Pt verbalized understanding and states no  concerns at this time.

## 2022-12-09 ENCOUNTER — HOSPITAL ENCOUNTER (OUTPATIENT)
Facility: OTHER | Age: 22
Discharge: HOME | End: 2022-12-09
Attending: SURGERY | Admitting: SURGERY
Payer: COMMERCIAL

## 2022-12-09 ENCOUNTER — ANESTHESIA (OUTPATIENT)
Dept: SURGERY | Facility: OTHER | Age: 22
End: 2022-12-09
Payer: COMMERCIAL

## 2022-12-09 VITALS
WEIGHT: 169 LBS | DIASTOLIC BLOOD PRESSURE: 66 MMHG | SYSTOLIC BLOOD PRESSURE: 122 MMHG | BODY MASS INDEX: 22.92 KG/M2 | HEART RATE: 88 BPM | TEMPERATURE: 98 F | OXYGEN SATURATION: 99 % | RESPIRATION RATE: 16 BRPM

## 2022-12-09 DIAGNOSIS — N62 MACROMASTIA: Primary | ICD-10-CM

## 2022-12-09 LAB
B-HCG UR QL: NEGATIVE
CTP QC/QA: YES

## 2022-12-09 PROCEDURE — 37000009 HC ANESTHESIA EA ADD 15 MINS: Performed by: SURGERY

## 2022-12-09 PROCEDURE — 81025 URINE PREGNANCY TEST: CPT | Performed by: SURGERY

## 2022-12-09 PROCEDURE — 63600175 PHARM REV CODE 636 W HCPCS: Performed by: SURGERY

## 2022-12-09 PROCEDURE — 25000003 PHARM REV CODE 250: Performed by: SURGERY

## 2022-12-09 PROCEDURE — 25000003 PHARM REV CODE 250: Performed by: ANESTHESIOLOGY

## 2022-12-09 PROCEDURE — 25000003 PHARM REV CODE 250: Performed by: NURSE ANESTHETIST, CERTIFIED REGISTERED

## 2022-12-09 PROCEDURE — 36000706: Performed by: SURGERY

## 2022-12-09 PROCEDURE — 19318 PR REDUCTION OF LARGE BREAST: ICD-10-PCS | Mod: 50,,, | Performed by: SURGERY

## 2022-12-09 PROCEDURE — P9045 ALBUMIN (HUMAN), 5%, 250 ML: HCPCS | Mod: JG | Performed by: NURSE ANESTHETIST, CERTIFIED REGISTERED

## 2022-12-09 PROCEDURE — 71000016 HC POSTOP RECOV ADDL HR: Performed by: SURGERY

## 2022-12-09 PROCEDURE — 63600175 PHARM REV CODE 636 W HCPCS: Performed by: NURSE ANESTHETIST, CERTIFIED REGISTERED

## 2022-12-09 PROCEDURE — 88305 TISSUE EXAM BY PATHOLOGIST: CPT | Mod: 26,,, | Performed by: PATHOLOGY

## 2022-12-09 PROCEDURE — 19318 BREAST REDUCTION: CPT | Mod: 50,,, | Performed by: SURGERY

## 2022-12-09 PROCEDURE — 71000015 HC POSTOP RECOV 1ST HR: Performed by: SURGERY

## 2022-12-09 PROCEDURE — 63600175 PHARM REV CODE 636 W HCPCS: Performed by: ANESTHESIOLOGY

## 2022-12-09 PROCEDURE — 88305 TISSUE EXAM BY PATHOLOGIST: ICD-10-PCS | Mod: 26,,, | Performed by: PATHOLOGY

## 2022-12-09 PROCEDURE — 37000008 HC ANESTHESIA 1ST 15 MINUTES: Performed by: SURGERY

## 2022-12-09 PROCEDURE — 71000039 HC RECOVERY, EACH ADD'L HOUR: Performed by: SURGERY

## 2022-12-09 PROCEDURE — 36000707: Performed by: SURGERY

## 2022-12-09 PROCEDURE — 88305 TISSUE EXAM BY PATHOLOGIST: CPT | Mod: 59 | Performed by: PATHOLOGY

## 2022-12-09 PROCEDURE — 71000033 HC RECOVERY, INTIAL HOUR: Performed by: SURGERY

## 2022-12-09 RX ORDER — SODIUM CHLORIDE 0.9 % (FLUSH) 0.9 %
3 SYRINGE (ML) INJECTION
Status: DISCONTINUED | OUTPATIENT
Start: 2022-12-09 | End: 2022-12-09 | Stop reason: HOSPADM

## 2022-12-09 RX ORDER — PHENYLEPHRINE HYDROCHLORIDE 10 MG/ML
INJECTION INTRAVENOUS
Status: DISCONTINUED | OUTPATIENT
Start: 2022-12-09 | End: 2022-12-09

## 2022-12-09 RX ORDER — KETAMINE HCL IN 0.9 % NACL 50 MG/5 ML
SYRINGE (ML) INTRAVENOUS
Status: DISCONTINUED | OUTPATIENT
Start: 2022-12-09 | End: 2022-12-09

## 2022-12-09 RX ORDER — CEFAZOLIN SODIUM 1 G/3ML
2 INJECTION, POWDER, FOR SOLUTION INTRAMUSCULAR; INTRAVENOUS
Status: COMPLETED | OUTPATIENT
Start: 2022-12-09 | End: 2022-12-09

## 2022-12-09 RX ORDER — DEXAMETHASONE SODIUM PHOSPHATE 4 MG/ML
INJECTION, SOLUTION INTRA-ARTICULAR; INTRALESIONAL; INTRAMUSCULAR; INTRAVENOUS; SOFT TISSUE
Status: DISCONTINUED | OUTPATIENT
Start: 2022-12-09 | End: 2022-12-09

## 2022-12-09 RX ORDER — ONDANSETRON 8 MG/1
8 TABLET, ORALLY DISINTEGRATING ORAL ONCE
Status: COMPLETED | OUTPATIENT
Start: 2022-12-09 | End: 2022-12-09

## 2022-12-09 RX ORDER — GABAPENTIN 300 MG/1
300 CAPSULE ORAL 3 TIMES DAILY
Qty: 15 CAPSULE | Refills: 0 | Status: SHIPPED | OUTPATIENT
Start: 2022-12-09 | End: 2024-02-22

## 2022-12-09 RX ORDER — MEPERIDINE HYDROCHLORIDE 25 MG/ML
12.5 INJECTION INTRAMUSCULAR; INTRAVENOUS; SUBCUTANEOUS ONCE AS NEEDED
Status: DISCONTINUED | OUTPATIENT
Start: 2022-12-09 | End: 2022-12-09 | Stop reason: HOSPADM

## 2022-12-09 RX ORDER — HYDROMORPHONE HYDROCHLORIDE 2 MG/ML
INJECTION, SOLUTION INTRAMUSCULAR; INTRAVENOUS; SUBCUTANEOUS
Status: DISCONTINUED | OUTPATIENT
Start: 2022-12-09 | End: 2022-12-09

## 2022-12-09 RX ORDER — IBUPROFEN 800 MG/1
800 TABLET ORAL 3 TIMES DAILY
Start: 2022-12-09

## 2022-12-09 RX ORDER — MIDAZOLAM HYDROCHLORIDE 1 MG/ML
INJECTION INTRAMUSCULAR; INTRAVENOUS
Status: DISCONTINUED | OUTPATIENT
Start: 2022-12-09 | End: 2022-12-09

## 2022-12-09 RX ORDER — SODIUM CHLORIDE 0.9 % (FLUSH) 0.9 %
10 SYRINGE (ML) INJECTION
Status: DISCONTINUED | OUTPATIENT
Start: 2022-12-09 | End: 2022-12-09 | Stop reason: HOSPADM

## 2022-12-09 RX ORDER — HYDROMORPHONE HYDROCHLORIDE 2 MG/ML
0.4 INJECTION, SOLUTION INTRAMUSCULAR; INTRAVENOUS; SUBCUTANEOUS EVERY 5 MIN PRN
Status: DISCONTINUED | OUTPATIENT
Start: 2022-12-09 | End: 2022-12-09 | Stop reason: HOSPADM

## 2022-12-09 RX ORDER — ONDANSETRON 2 MG/ML
INJECTION INTRAMUSCULAR; INTRAVENOUS
Status: DISCONTINUED | OUTPATIENT
Start: 2022-12-09 | End: 2022-12-09

## 2022-12-09 RX ORDER — OXYCODONE HYDROCHLORIDE 5 MG/1
5 TABLET ORAL ONCE
Status: COMPLETED | OUTPATIENT
Start: 2022-12-09 | End: 2022-12-09

## 2022-12-09 RX ORDER — OXYCODONE HYDROCHLORIDE 5 MG/1
5 TABLET ORAL
Status: DISCONTINUED | OUTPATIENT
Start: 2022-12-09 | End: 2022-12-09 | Stop reason: HOSPADM

## 2022-12-09 RX ORDER — PROPOFOL 10 MG/ML
VIAL (ML) INTRAVENOUS
Status: DISCONTINUED | OUTPATIENT
Start: 2022-12-09 | End: 2022-12-09

## 2022-12-09 RX ORDER — ACETAMINOPHEN 325 MG/1
975 TABLET ORAL
Status: COMPLETED | OUTPATIENT
Start: 2022-12-09 | End: 2022-12-09

## 2022-12-09 RX ORDER — FENTANYL CITRATE 50 UG/ML
INJECTION, SOLUTION INTRAMUSCULAR; INTRAVENOUS
Status: DISCONTINUED | OUTPATIENT
Start: 2022-12-09 | End: 2022-12-09

## 2022-12-09 RX ORDER — PROCHLORPERAZINE EDISYLATE 5 MG/ML
5 INJECTION INTRAMUSCULAR; INTRAVENOUS EVERY 30 MIN PRN
Status: DISCONTINUED | OUTPATIENT
Start: 2022-12-09 | End: 2022-12-09 | Stop reason: HOSPADM

## 2022-12-09 RX ORDER — ALBUMIN HUMAN 50 G/1000ML
SOLUTION INTRAVENOUS CONTINUOUS PRN
Status: DISCONTINUED | OUTPATIENT
Start: 2022-12-09 | End: 2022-12-09

## 2022-12-09 RX ORDER — DIPHENHYDRAMINE HYDROCHLORIDE 50 MG/ML
25 INJECTION INTRAMUSCULAR; INTRAVENOUS EVERY 6 HOURS PRN
Status: DISCONTINUED | OUTPATIENT
Start: 2022-12-09 | End: 2022-12-09 | Stop reason: HOSPADM

## 2022-12-09 RX ORDER — OXYCODONE HYDROCHLORIDE 5 MG/1
5 TABLET ORAL EVERY 4 HOURS PRN
Qty: 10 TABLET | Refills: 0 | Status: SHIPPED | OUTPATIENT
Start: 2022-12-09 | End: 2024-02-22

## 2022-12-09 RX ORDER — ACETAMINOPHEN 500 MG
1000 TABLET ORAL EVERY 8 HOURS
Refills: 0
Start: 2022-12-09

## 2022-12-09 RX ORDER — LIDOCAINE HYDROCHLORIDE 10 MG/ML
1 INJECTION, SOLUTION EPIDURAL; INFILTRATION; INTRACAUDAL; PERINEURAL ONCE
Status: DISCONTINUED | OUTPATIENT
Start: 2022-12-09 | End: 2022-12-09 | Stop reason: HOSPADM

## 2022-12-09 RX ORDER — ROCURONIUM BROMIDE 10 MG/ML
INJECTION, SOLUTION INTRAVENOUS
Status: DISCONTINUED | OUTPATIENT
Start: 2022-12-09 | End: 2022-12-09

## 2022-12-09 RX ORDER — LIDOCAINE HCL/PF 100 MG/5ML
SYRINGE (ML) INTRAVENOUS
Status: DISCONTINUED | OUTPATIENT
Start: 2022-12-09 | End: 2022-12-09

## 2022-12-09 RX ADMIN — HYDROMORPHONE HYDROCHLORIDE 0.5 MG: 2 INJECTION INTRAMUSCULAR; INTRAVENOUS; SUBCUTANEOUS at 12:12

## 2022-12-09 RX ADMIN — ROCURONIUM BROMIDE 20 MG: 10 SOLUTION INTRAVENOUS at 09:12

## 2022-12-09 RX ADMIN — SODIUM CHLORIDE, SODIUM LACTATE, POTASSIUM CHLORIDE, AND CALCIUM CHLORIDE: .6; .31; .03; .02 INJECTION, SOLUTION INTRAVENOUS at 11:12

## 2022-12-09 RX ADMIN — HYDROMORPHONE HYDROCHLORIDE 0.4 MG: 2 INJECTION INTRAMUSCULAR; INTRAVENOUS; SUBCUTANEOUS at 01:12

## 2022-12-09 RX ADMIN — ACETAMINOPHEN 975 MG: 325 TABLET, FILM COATED ORAL at 07:12

## 2022-12-09 RX ADMIN — HYDROMORPHONE HYDROCHLORIDE 0.5 MG: 2 INJECTION INTRAMUSCULAR; INTRAVENOUS; SUBCUTANEOUS at 09:12

## 2022-12-09 RX ADMIN — PHENYLEPHRINE HYDROCHLORIDE 100 MCG: 10 INJECTION INTRAVENOUS at 10:12

## 2022-12-09 RX ADMIN — FENTANYL CITRATE 100 MCG: 50 INJECTION, SOLUTION INTRAMUSCULAR; INTRAVENOUS at 08:12

## 2022-12-09 RX ADMIN — HYDROMORPHONE HYDROCHLORIDE 0.5 MG: 2 INJECTION INTRAMUSCULAR; INTRAVENOUS; SUBCUTANEOUS at 11:12

## 2022-12-09 RX ADMIN — SODIUM CHLORIDE, SODIUM LACTATE, POTASSIUM CHLORIDE, AND CALCIUM CHLORIDE: .6; .31; .03; .02 INJECTION, SOLUTION INTRAVENOUS at 09:12

## 2022-12-09 RX ADMIN — ROCURONIUM BROMIDE 50 MG: 10 SOLUTION INTRAVENOUS at 08:12

## 2022-12-09 RX ADMIN — HYDROMORPHONE HYDROCHLORIDE 0.4 MG: 2 INJECTION INTRAMUSCULAR; INTRAVENOUS; SUBCUTANEOUS at 12:12

## 2022-12-09 RX ADMIN — FENTANYL CITRATE 50 MCG: 50 INJECTION, SOLUTION INTRAMUSCULAR; INTRAVENOUS at 09:12

## 2022-12-09 RX ADMIN — CEFAZOLIN 2 G: 330 INJECTION, POWDER, FOR SOLUTION INTRAMUSCULAR; INTRAVENOUS at 09:12

## 2022-12-09 RX ADMIN — CARBOXYMETHYLCELLULOSE SODIUM 2 DROP: 2.5 SOLUTION/ DROPS OPHTHALMIC at 08:12

## 2022-12-09 RX ADMIN — Medication 30 MG: at 09:12

## 2022-12-09 RX ADMIN — SUGAMMADEX 160 MG: 100 INJECTION, SOLUTION INTRAVENOUS at 12:12

## 2022-12-09 RX ADMIN — PROPOFOL 200 MG: 10 INJECTION, EMULSION INTRAVENOUS at 08:12

## 2022-12-09 RX ADMIN — SODIUM CHLORIDE, SODIUM LACTATE, POTASSIUM CHLORIDE, AND CALCIUM CHLORIDE: .6; .31; .03; .02 INJECTION, SOLUTION INTRAVENOUS at 07:12

## 2022-12-09 RX ADMIN — ONDANSETRON HYDROCHLORIDE 4 MG: 2 INJECTION INTRAMUSCULAR; INTRAVENOUS at 11:12

## 2022-12-09 RX ADMIN — MIDAZOLAM HYDROCHLORIDE 2 MG: 1 INJECTION, SOLUTION INTRAMUSCULAR; INTRAVENOUS at 08:12

## 2022-12-09 RX ADMIN — OXYCODONE 5 MG: 5 TABLET ORAL at 03:12

## 2022-12-09 RX ADMIN — OXYCODONE 5 MG: 5 TABLET ORAL at 12:12

## 2022-12-09 RX ADMIN — DEXAMETHASONE SODIUM PHOSPHATE 8 MG: 4 INJECTION, SOLUTION INTRAMUSCULAR; INTRAVENOUS at 08:12

## 2022-12-09 RX ADMIN — PHENYLEPHRINE HYDROCHLORIDE 100 MCG: 10 INJECTION INTRAVENOUS at 11:12

## 2022-12-09 RX ADMIN — ROCURONIUM BROMIDE 10 MG: 10 SOLUTION INTRAVENOUS at 10:12

## 2022-12-09 RX ADMIN — ONDANSETRON 8 MG: 8 TABLET, ORALLY DISINTEGRATING ORAL at 03:12

## 2022-12-09 RX ADMIN — ALBUMIN (HUMAN): 12.5 SOLUTION INTRAVENOUS at 09:12

## 2022-12-09 RX ADMIN — LIDOCAINE HYDROCHLORIDE 100 MG: 20 INJECTION, SOLUTION INTRAVENOUS at 08:12

## 2022-12-09 NOTE — OR NURSING
[facility]     Mar/chart reviewed  Follow up AHRF, COVID19 pneumonia  Sedated on vent unable to provide subjective  Did poorly supine, but tolerating prone well    Vital Sign Min/Max for last 24 hours  Temp  Min: 97.4 °F (36.3 °C)  Max: 97.7 °F (36.5 °C)   BP  Min: 91/41  Max: 169/80   Pulse  Min: 51  Max: 98   Resp  Min: 18  Max: 18   SpO2  Min: 83 %  Max: 100 %   No data recorded   Weight  Min: 68 kg (150 lb)  Max: 68 kg (150 lb)   1922/1175  Appears ill, sedated on vent  perrl, eomi, normal sclera  mmm, no jvd, trachea midline, neck supple,  chest cta bilaterally, no crackles, no wheezes,   rrr,   soft, nt, nd +bs,  no c/c/ e  Skin warm, dry no rashes    Labs: 9/7: reviewed:  7.36/55/101 (45%; 18 rr; 8 peep; 450tv)  Glucose 209  Bun 41  Cr 1  Na 149  k 5.1  Bicarb 26  crp 2.5  Wbc 15  hgb 13.8  plts 232  CXR: 9/7: pending    A/P:  1. Acute hypoxemic and hypercapnic respiratory failure -- continue vent support, wean as able. He is really doing pretty well at this point all considering. Would wean fi02 at this point to 40% can try and wean peep now.   can leave supine, if he remains at 40% supine I would stop proning and let paralytic lapse  2. COVID19 pneumonia -- CRP is fairly low, d dimer is low -- would keep the current measures for this in place, monitor for secondary bacterial infection. Check procal tomorrow  D dimer is low, neg ct angio and neg venous doppler, continue prophylactic lovenox  3. DM -- accuechecks ok, continue current  4. Pulmonary hypertension -- no specific therapy for now, repeat echo when better    Doing reasonably well really, hopefully will tolerate supine soon and can minimize sedation as much as possible here.    Discussed with Dr. Webster, dicussed with RN and discussed with RT    CC 35 mins.    Pt requesting additional pain pill for incisional pain 6/10.  Dr. Garcia called.  Order for additional oxy received.

## 2022-12-09 NOTE — ANESTHESIA POSTPROCEDURE EVALUATION
Anesthesia Post Evaluation    Patient: Bibiana Suero    Procedure(s) Performed: Procedure(s) (LRB):  MAMMOPLASTY, REDUCTION (Bilateral)    Final Anesthesia Type: general      Patient location during evaluation: PACU  Patient participation: Yes- Able to Participate  Level of consciousness: awake and alert  Post-procedure vital signs: reviewed and stable  Pain management: adequate  Airway patency: patent    PONV status at discharge: No PONV  Anesthetic complications: no      Cardiovascular status: blood pressure returned to baseline and stable  Respiratory status: room air  Hydration status: euvolemic  Follow-up not needed.          Vitals Value Taken Time   /65 12/09/22 1252   Temp 36.9 °C (98.4 °F) 12/09/22 1235   Pulse 91 12/09/22 1252   Resp 16 12/09/22 1250   SpO2 96 % 12/09/22 1252   Vitals shown include unvalidated device data.      No case tracking events are documented in the log.      Pain/Deloris Score: Pain Rating Prior to Med Admin: 7 (12/9/2022 12:50 PM)

## 2022-12-09 NOTE — PLAN OF CARE
Bibiana Suero has met all discharge criteria from Phase II. Vital Signs are stable, ambulating  without difficulty.Pain is now under control and tolerable for the pt. Pain score 4 at this time. States nausea is subsiding.  Discharge instructions given, patient verbalized understanding. Discharged from facility via wheelchair in stable condition.

## 2022-12-09 NOTE — BRIEF OP NOTE
[facility] ([place])  Brief Operative Note     SUMMARY     Surgery Date: 12/9/2022     Surgeon(s) and Role:     * DO Sal Russell Primary        Pre-op Diagnosis:  Macromastia [N62]    Post-op Diagnosis:  Post-Op Diagnosis Codes:     * Macromastia [N62]    Procedure(s) (LRB):  MAMMOPLASTY, REDUCTION (Bilateral)    Anesthesia: General    Description of the findings of the procedure: wise pattern - superomedial pedicle  Right 486 gm, Left 544 gm    Findings/Key Components: See operative report    Estimated Blood Loss: * No values recorded between 12/9/2022  9:13 AM and 12/9/2022 12:12 PM *         Specimens:   Specimen (24h ago, onward)       Start     Ordered    12/09/22 1056  Specimen to Pathology, Surgery Breast  Once        Comments: Pre-op Diagnosis: Macromastia [N62]Procedure(s):MAMMOPLASTY, REDUCTION Number of specimens: 2Name of specimens: 1. Right breast tissue2. Left breast tissue     References:    Click here for ordering Quick Tip   Question Answer Comment   Procedure Type: Breast    Specimen Class: Routine/Screening    Which provider would you like to cc? AUGUSTIN GOMEZ    Release to patient Immediate        12/09/22 1055                    Implants: * No implants in log *    Complications: None    Discharge Note    SUMMARY     Admit Date: 12/9/2022    Discharge Date and Time:  12/09/2022 12:13 PM    Hospital Course: Patient was placed in observation status for the above procedure.  See above.  Postoperatively was discharged home from the PACU once criteria was met.    Final Diagnosis: Post-Op Diagnosis Codes:     * Macromastia [N62]    Disposition: Home or Self Care    Follow Up/Patient Instructions:     Medications:  Reconciled Home Medications:      Medication List        START taking these medications      acetaminophen 500 MG tablet  Commonly known as: TYLENOL  Take 2 tablets (1,000 mg total) by mouth every 8 (eight) hours.     gabapentin 300 MG capsule  Commonly known as:  NEURONTIN  Take 1 capsule (300 mg total) by mouth 3 (three) times daily. for 5 days     ibuprofen 800 MG tablet  Commonly known as: ADVIL,MOTRIN  Take 1 tablet (800 mg total) by mouth 3 (three) times daily.     oxyCODONE 5 MG immediate release tablet  Commonly known as: ROXICODONE  Take 1 tablet (5 mg total) by mouth every 4 (four) hours as needed for Pain.            CONTINUE taking these medications      ergocalciferol 50,000 unit Cap  Commonly known as: ERGOCALCIFEROL  TAKE 1 CAPSULE BY MOUTH ONE TIME PER WEEK     FIBER ORAL  Take by mouth once daily.     IRON ORAL  Take by mouth once daily.     PROBIOTIC ORAL  Take by mouth once daily.     UNABLE TO FIND  Take by mouth once daily. Melaleuca Vitality            Discharge Procedure Orders   Diet general     Lifting restrictions   Order Comments: Light activity only     Wear Surgical Bra and/or Girdle at all times (may remove for short times to shower)   Order Comments: Wear Surgical Bra and/or Girdle at all times (may remove for short times to shower)

## 2022-12-09 NOTE — TRANSFER OF CARE
Anesthesia Transfer of Care Note    Patient: Bibiana Suero    Procedure(s) Performed: Procedure(s) (LRB):  MAMMOPLASTY, REDUCTION (Bilateral)    Patient location: PACU    Anesthesia Type: general    Transport from OR: Transported from OR on 2-3 L/min O2 by NC with adequate spontaneous ventilation    Post pain: adequate analgesia    Post assessment: no apparent anesthetic complications    Post vital signs: stable    Level of consciousness: sedated and responds to stimulation    Nausea/Vomiting: no nausea/vomiting    Complications: none    Transfer of care protocol was followed      Last vitals:   Visit Vitals  /69 (BP Location: Left arm, Patient Position: Sitting)   Pulse 90   Temp 36.9 °C (98.4 °F) (Temporal)   Resp 16   Wt 76.7 kg (169 lb)   SpO2 98%   Breastfeeding No   BMI 22.92 kg/m²

## 2022-12-09 NOTE — ANESTHESIA PROCEDURE NOTES
Intubation    Date/Time: 12/9/2022 8:53 AM  Performed by: Susanna Gary CRNA  Authorized by: Brandon Garcia MD     Intubation:     Induction:  Intravenous    Intubated:  Postinduction    Mask Ventilation:  Easy mask    Attempts:  1    Attempted By:  CRNA    Method of Intubation:  Video laryngoscopy    Blade:  [place] 3    Laryngeal View Grade: Grade I - full view of cords      Difficult Airway Encountered?: No      Complications:  None    Airway Device:  Oral endotracheal tube    Airway Device Size:  7.0    Tube secured:  22    Secured at:  The lips    Placement Verified By:  Capnometry    Complicating Factors:  None    Findings Post-Intubation:  BS equal bilateral and atraumatic/condition of teeth unchanged

## 2022-12-09 NOTE — PATIENT INSTRUCTIONS
Plastic Surgery Discharge Instructions  Leodan Loyd, DO    Wound Care  1. Shower daily beginning 2 days after surgery  2. Okay to let warm soapy water run over the wound at that time  3. Do not submerge wounds in the bath  4. Leave any skin glue or mesh tape in place    Drain Care  If you have drains, strip tubing and record output when drain bulb becomes half full, or at least twice daily  Record output for each drain and bring to our follow up appointment    Diet  Regular Diet    Activity  Light activity only - no lifting greater than 10 lbs  Avoid strenuous activity that would cause you to get hot or sweaty    Medications  You have been given a prescription for narcotic pain medication, anti-inflammatory pain, muscle relaxer, and nerve pain  Please take medications as prescribed     What to call for:  1. Sustained fever > 101.0  2. Changes in color, sensation, temperature or pain at surgical site  3. Redness and/or drainage from the surgical site  4. Any reaction to prescribed medications  5. Continuous bloody drainage from surgical drains  6. Wound vac malfunction  7. Questions related to the procedure    Follow-up  1. Please call [phone] to schedule or confirm your follow up visit at the [facility] [email or web address] clinic on [facility]  2. Call with any questions or concerns.  2. After hours call [phone] - ask to speak with the Plastic Surgery fellow on call

## 2022-12-12 NOTE — OP NOTE
[facility]  Plastic Surgery  Operative Note    SUMMARY     Date of Procedure: 12/9/2022     Location:  [facility]    Procedure(s): Procedure(s) (LRB):  MAMMOPLASTY, REDUCTION (Bilateral)     Surgeon(s) and Role:     * Allen Loyd DO - Primary    Assistant: Matrin Holloway DO, Fellow    Pre-Operative Diagnosis: Macromastia [N62], gender dysphoria    Post-Operative Diagnosis: same    Anesthesia: General    Indications for Procedure:  This is a 22-year-old.  She is transgender, she has gender dysphoria and is fluid with her pronouns.  She also has large heavy breasts.  This causes discomfort.  She currently does not bind her chest because she feels her breasts are too large.  She is not interested in female to male top surgery at this time.  She wanted her large heavy breasts reduce for symptom relief.  This is approved by her insurance.    Operative Findings (including complications, if any):  Bilateral wise pattern breast reduction, superior medial pedicle   Right breast 486 g, left breast 544 g    Description of Procedures:  Patient was seen in the preoperative holding area.  Consents were signed and all questions were answered.  Breasts were marked for a wise pattern reduction.  The left breast was noted to be larger than the right.    Patient was taken the operating room general anesthesia was induced.  Both breasts were prepped and draped in sterile fashion.  Tumescent solution was injected along the incisions in the plan plans of dissection in both breasts.  I began on the right side.  A 34 mm cookie cutter was used around the areola.  Then incised the superior medial pedicle in the rim of the areola.  Superior medial pedicle was de-epithelialized.  Next all the wise pattern skin incisions were made using a knife.  I began the reduction by incising the tissue along the bottom of the superior medial pedicle straight down the chest wall.  I then removed all of the tissue from medial  to lateral from below medial skin flap to the IMF.  This was removed as 1 large piece.  I did undermine the lateral flap some to remove extra tissue.  After this was done both flaps were undermined to allow them to advance to the IMF.  Also the lateral edge of the superior medial pedicle was cut we could swing into position.  The dermis on the pedicle was incised with a was not tethered.  The wound was checked for hemostasis.  A 2-0 Vicryl was then used at the T point and the breast was temporarily stapled.  The breast was a little larger than the patient wanted.  I then excised some additional lateral tissue and some tissue off of the bottom of the pedicle.  Now another 2-0 Vicryl was placed at the T point of the breast was temporarily stapled.      Now on the left side the 34 mm cookie cutter was used and the breast reduction was performed in a similar fashion using superior medial pedicle.  More breast tissue was removed on the left side as this was her larger breast preoperatively.  Stitch was placed at the T point and the breast was temporarily stapled.  The patient was sat up.  The patient was still a little bit more projecting on the right side.  Otherwise there was good symmetry.  Small lateral dog ears were marked.  The patient was laid back flat.    Both breasts were opened.  They are irrigated with saline.  Small amount of tissue was removed from the right side.  There was good symmetry.  Hemostasis was achieved.  Finally both breasts were closed.  Three 2-0 Vicryl were placed at the T point 2-0 Monocryl was used to close the deep dermis of the horizontal incision.  The vertical incision was closed with interrupted 3-0 Monocryl.  A 34 mm cookie cutter was used and the nipples were positioned 4.5 cm from the bottom of the areola to the IMF.  Fifteen blade was used to cut the skin inset the areolas.  Both nipples were healthy.  The areolas were inset with for deep dermal 3-0 Vicryl, followed by half buried  4-0 Prolene horizontal mattress sutures.  The horizontal subcuticular layer was closed with a running 3-0 Stratafix.  The areola and vertical subcuticular layer was closed with a 4-0 Stratafix.    Patient tolerated procedure well and was taken to recovery in stable condition.  Photos were taken during the case.    Significant Surgical Tasks Conducted by the Assistant(s), if Applicable: na    Estimated Blood Loss (EBL): 100mL           Implants: * No implants in log *    Specimens:   Specimen (24h ago, onward)      None                    Condition: Good    Disposition: PACU - hemodynamically stable., DC home    Attestation: I was present and scrubbed for the entire procedure.

## 2022-12-15 ENCOUNTER — OFFICE VISIT (OUTPATIENT)
Dept: PLASTIC SURGERY | Facility: CLINIC | Age: 22
End: 2022-12-15
Payer: COMMERCIAL

## 2022-12-15 VITALS
SYSTOLIC BLOOD PRESSURE: 110 MMHG | BODY MASS INDEX: 22.89 KG/M2 | WEIGHT: 169 LBS | HEIGHT: 72 IN | DIASTOLIC BLOOD PRESSURE: 63 MMHG | HEART RATE: 70 BPM

## 2022-12-15 DIAGNOSIS — N62 MACROMASTIA: Primary | ICD-10-CM

## 2022-12-15 LAB
FINAL PATHOLOGIC DIAGNOSIS: NORMAL
Lab: NORMAL

## 2022-12-15 PROCEDURE — 3074F SYST BP LT 130 MM HG: CPT | Mod: CPTII,S$GLB,, | Performed by: SURGERY

## 2022-12-15 PROCEDURE — 1159F PR MEDICATION LIST DOCUMENTED IN MEDICAL RECORD: ICD-10-PCS | Mod: CPTII,S$GLB,, | Performed by: SURGERY

## 2022-12-15 PROCEDURE — 1159F MED LIST DOCD IN RCRD: CPT | Mod: CPTII,S$GLB,, | Performed by: SURGERY

## 2022-12-15 PROCEDURE — 3078F PR MOST RECENT DIASTOLIC BLOOD PRESSURE < 80 MM HG: ICD-10-PCS | Mod: CPTII,S$GLB,, | Performed by: SURGERY

## 2022-12-15 PROCEDURE — 99499 UNLISTED E&M SERVICE: CPT | Mod: S$GLB,,, | Performed by: SURGERY

## 2022-12-15 PROCEDURE — 3074F PR MOST RECENT SYSTOLIC BLOOD PRESSURE < 130 MM HG: ICD-10-PCS | Mod: CPTII,S$GLB,, | Performed by: SURGERY

## 2022-12-15 PROCEDURE — 99999 PR PBB SHADOW E&M-EST. PATIENT-LVL III: ICD-10-PCS | Mod: PBBFAC,,, | Performed by: SURGERY

## 2022-12-15 PROCEDURE — 99499 NO LOS: ICD-10-PCS | Mod: S$GLB,,, | Performed by: SURGERY

## 2022-12-15 PROCEDURE — 3008F BODY MASS INDEX DOCD: CPT | Mod: CPTII,S$GLB,, | Performed by: SURGERY

## 2022-12-15 PROCEDURE — 99999 PR PBB SHADOW E&M-EST. PATIENT-LVL III: CPT | Mod: PBBFAC,,, | Performed by: SURGERY

## 2022-12-15 PROCEDURE — 3078F DIAST BP <80 MM HG: CPT | Mod: CPTII,S$GLB,, | Performed by: SURGERY

## 2022-12-15 PROCEDURE — 3008F PR BODY MASS INDEX (BMI) DOCUMENTED: ICD-10-PCS | Mod: CPTII,S$GLB,, | Performed by: SURGERY

## 2022-12-15 NOTE — PROGRESS NOTES
Glopete is 1 week status post bilateral breast reduction.  She is very happy with her size.  She is got she would a breast reduction.      Steri-Strips and Prolene sutures were removed.  Areolas are healing well.  She still has her Prineo tape in place.  There is no evidence of any wound breakdown at the T point   No drains, some resolving ecchymosis and edema    Glory is doing well she is really happy.  I will see her back in 2 weeks.  I gave her instructions to remove or  trim the Prineo tape if it starts coming off sooner.    Leodan Loyd, DO  Plastic and Reconstructive Surgery

## 2022-12-29 ENCOUNTER — OFFICE VISIT (OUTPATIENT)
Dept: PLASTIC SURGERY | Facility: CLINIC | Age: 22
End: 2022-12-29
Payer: COMMERCIAL

## 2022-12-29 VITALS
HEIGHT: 72 IN | BODY MASS INDEX: 22.89 KG/M2 | HEART RATE: 73 BPM | DIASTOLIC BLOOD PRESSURE: 59 MMHG | WEIGHT: 169 LBS | SYSTOLIC BLOOD PRESSURE: 95 MMHG

## 2022-12-29 DIAGNOSIS — F64.9 GENDER DYSPHORIA: Primary | ICD-10-CM

## 2022-12-29 DIAGNOSIS — N62 MACROMASTIA: ICD-10-CM

## 2022-12-29 PROCEDURE — 99499 NO LOS: ICD-10-PCS | Mod: S$GLB,,, | Performed by: SURGERY

## 2022-12-29 PROCEDURE — 3008F BODY MASS INDEX DOCD: CPT | Mod: CPTII,S$GLB,, | Performed by: SURGERY

## 2022-12-29 PROCEDURE — 3078F PR MOST RECENT DIASTOLIC BLOOD PRESSURE < 80 MM HG: ICD-10-PCS | Mod: CPTII,S$GLB,, | Performed by: SURGERY

## 2022-12-29 PROCEDURE — 3074F PR MOST RECENT SYSTOLIC BLOOD PRESSURE < 130 MM HG: ICD-10-PCS | Mod: CPTII,S$GLB,, | Performed by: SURGERY

## 2022-12-29 PROCEDURE — 1159F MED LIST DOCD IN RCRD: CPT | Mod: CPTII,S$GLB,, | Performed by: SURGERY

## 2022-12-29 PROCEDURE — 99999 PR PBB SHADOW E&M-EST. PATIENT-LVL III: CPT | Mod: PBBFAC,,, | Performed by: SURGERY

## 2022-12-29 PROCEDURE — 3078F DIAST BP <80 MM HG: CPT | Mod: CPTII,S$GLB,, | Performed by: SURGERY

## 2022-12-29 PROCEDURE — 3074F SYST BP LT 130 MM HG: CPT | Mod: CPTII,S$GLB,, | Performed by: SURGERY

## 2022-12-29 PROCEDURE — 3008F PR BODY MASS INDEX (BMI) DOCUMENTED: ICD-10-PCS | Mod: CPTII,S$GLB,, | Performed by: SURGERY

## 2022-12-29 PROCEDURE — 1159F PR MEDICATION LIST DOCUMENTED IN MEDICAL RECORD: ICD-10-PCS | Mod: CPTII,S$GLB,, | Performed by: SURGERY

## 2022-12-29 PROCEDURE — 99999 PR PBB SHADOW E&M-EST. PATIENT-LVL III: ICD-10-PCS | Mod: PBBFAC,,, | Performed by: SURGERY

## 2022-12-29 PROCEDURE — 99499 UNLISTED E&M SERVICE: CPT | Mod: S$GLB,,, | Performed by: SURGERY

## 2022-12-29 NOTE — PROGRESS NOTES
Glory is now three weeks s/p BBR  She is doing well, has no concerns    Subcentimeter tab at the t point on the left  Slight residual edema     Okay for any bra or binding  Edema will continue to improve  Ointment to t point for next 1-2 weeks  Follow up three months    Leodan Loyd, DO  Plastic and Reconstructive Surgery

## 2023-04-04 ENCOUNTER — OFFICE VISIT (OUTPATIENT)
Dept: PLASTIC SURGERY | Facility: CLINIC | Age: 23
End: 2023-04-04
Payer: COMMERCIAL

## 2023-04-04 VITALS — DIASTOLIC BLOOD PRESSURE: 77 MMHG | SYSTOLIC BLOOD PRESSURE: 123 MMHG | HEART RATE: 73 BPM

## 2023-04-04 DIAGNOSIS — N62 MACROMASTIA: Primary | ICD-10-CM

## 2023-04-04 PROCEDURE — 3078F DIAST BP <80 MM HG: CPT | Mod: CPTII,S$GLB,, | Performed by: SURGERY

## 2023-04-04 PROCEDURE — 99999 PR PBB SHADOW E&M-EST. PATIENT-LVL III: ICD-10-PCS | Mod: PBBFAC,,, | Performed by: SURGERY

## 2023-04-04 PROCEDURE — 99212 PR OFFICE/OUTPT VISIT, EST, LEVL II, 10-19 MIN: ICD-10-PCS | Mod: S$GLB,,, | Performed by: SURGERY

## 2023-04-04 PROCEDURE — 3074F SYST BP LT 130 MM HG: CPT | Mod: CPTII,S$GLB,, | Performed by: SURGERY

## 2023-04-04 PROCEDURE — 1159F PR MEDICATION LIST DOCUMENTED IN MEDICAL RECORD: ICD-10-PCS | Mod: CPTII,S$GLB,, | Performed by: SURGERY

## 2023-04-04 PROCEDURE — 3074F PR MOST RECENT SYSTOLIC BLOOD PRESSURE < 130 MM HG: ICD-10-PCS | Mod: CPTII,S$GLB,, | Performed by: SURGERY

## 2023-04-04 PROCEDURE — 3078F PR MOST RECENT DIASTOLIC BLOOD PRESSURE < 80 MM HG: ICD-10-PCS | Mod: CPTII,S$GLB,, | Performed by: SURGERY

## 2023-04-04 PROCEDURE — 99999 PR PBB SHADOW E&M-EST. PATIENT-LVL III: CPT | Mod: PBBFAC,,, | Performed by: SURGERY

## 2023-04-04 PROCEDURE — 99212 OFFICE O/P EST SF 10 MIN: CPT | Mod: S$GLB,,, | Performed by: SURGERY

## 2023-04-04 PROCEDURE — 1159F MED LIST DOCD IN RCRD: CPT | Mod: CPTII,S$GLB,, | Performed by: SURGERY

## 2023-04-04 NOTE — PROGRESS NOTES
Sendy is now about 4 months status post bilateral breast reduction.  Overall she is doing well.  She is no specific complaints.  She is no concern of her healing.        Vitals:    04/04/23 1350   BP: 123/77   Pulse: 73     On exam.  She is well-healed wise pattern incisions.  Her left breast is slightly larger than the right.  She has by the 2-3 cm area of slightly hypertrophic scar along the medial portion of the IMF incision  There is also slightly thickened scar from the 12th 2 o'clock position on the left breast  No concerns of the right breast   No palpable fat necrosis in either breast    Overall she is doing well she is happy   We discussed scar massage of the areola and the IMF incision on the left.  Also discussed that she can use any scar creams or silicone taping if she would like.    These continued to worsen recommended that she come back and we could do steroid injections.    Also informed her that this is typically the darkest and thickest that the scars will be and they should start to thin out in line over the next several months   I will see her back in 6 months and plan to get postoperative photos then.    Leodan Loyd, DO  Plastic and Reconstructive Surgery    I spent a total of 10 minutes on the day of the visit.  This includes face to face time and non-face to face time preparing to see the patient (eg, review of tests), obtaining and/or reviewing separately obtained history, documenting clinical information in the electronic or other health record, independently interpreting results and communicating results to the patient/family/caregiver, or care coordinator.

## 2023-05-04 ENCOUNTER — PATIENT MESSAGE (OUTPATIENT)
Dept: INTERNAL MEDICINE | Facility: CLINIC | Age: 23
End: 2023-05-04
Payer: COMMERCIAL

## 2023-05-04 RX ORDER — MUPIROCIN 20 MG/G
OINTMENT TOPICAL 3 TIMES DAILY
Qty: 30 G | Refills: 0 | Status: SHIPPED | OUTPATIENT
Start: 2023-05-04 | End: 2024-02-22

## 2023-06-18 ENCOUNTER — OFFICE VISIT (OUTPATIENT)
Dept: URGENT CARE | Facility: CLINIC | Age: 23
End: 2023-06-18
Payer: COMMERCIAL

## 2023-06-18 VITALS
TEMPERATURE: 98 F | SYSTOLIC BLOOD PRESSURE: 109 MMHG | HEIGHT: 72 IN | HEART RATE: 75 BPM | DIASTOLIC BLOOD PRESSURE: 71 MMHG | BODY MASS INDEX: 22.89 KG/M2 | RESPIRATION RATE: 18 BRPM | WEIGHT: 169 LBS | OXYGEN SATURATION: 98 %

## 2023-06-18 DIAGNOSIS — Z23 NEED FOR TDAP VACCINATION: ICD-10-CM

## 2023-06-18 DIAGNOSIS — T14.8XXA PIERCING: Primary | ICD-10-CM

## 2023-06-18 PROCEDURE — 90471 TDAP VACCINE GREATER THAN OR EQUAL TO 7YO IM: ICD-10-PCS | Mod: S$GLB,,, | Performed by: NURSE PRACTITIONER

## 2023-06-18 PROCEDURE — 99214 PR OFFICE/OUTPT VISIT, EST, LEVL IV, 30-39 MIN: ICD-10-PCS | Mod: 25,S$GLB,, | Performed by: NURSE PRACTITIONER

## 2023-06-18 PROCEDURE — 90715 TDAP VACCINE 7 YRS/> IM: CPT | Mod: S$GLB,,, | Performed by: NURSE PRACTITIONER

## 2023-06-18 PROCEDURE — 99214 OFFICE O/P EST MOD 30 MIN: CPT | Mod: 25,S$GLB,, | Performed by: NURSE PRACTITIONER

## 2023-06-18 PROCEDURE — 90715 TDAP VACCINE GREATER THAN OR EQUAL TO 7YO IM: ICD-10-PCS | Mod: S$GLB,,, | Performed by: NURSE PRACTITIONER

## 2023-06-18 PROCEDURE — 90471 IMMUNIZATION ADMIN: CPT | Mod: S$GLB,,, | Performed by: NURSE PRACTITIONER

## 2023-06-18 RX ORDER — MUPIROCIN 20 MG/G
OINTMENT TOPICAL 2 TIMES DAILY
Qty: 30 G | Refills: 0 | Status: SHIPPED | OUTPATIENT
Start: 2023-06-18 | End: 2023-06-23

## 2023-06-18 NOTE — PROGRESS NOTES
Subjective:      Patient ID: Bibiana Suero is a 22 y.o. female.    Vitals:  height is 6' (1.829 m) and weight is 76.7 kg (169 lb). Her temperature is 98.3 °F (36.8 °C). Her blood pressure is 109/71 and her pulse is 75. Her respiration is 18 and oxygen saturation is 98%.     Chief Complaint: Recurrent Skin Infections (Lip ring infected)    22 yr female present with Lip ring infected. Pt states that she got her lip pierced in march and it is infected. Pt states that her lip feels like it has a knot in it, patient reports drainage on and off, but not constant, patient reports she was unable to take care of it as she was working but now recently she quit her job so she will be able to take care of her piercing more, upon chart review patient she  sent message to her primary in May about the same issue and was prescribed Bactroban, patient reports she was unable to picked up the prescription as she was not aware that she was prescribed the prescription by her primary and did in received notification from the pharmacy,  denies fever, body aches or chills, denies cough, wheezing or shortness of breath, denies nausea, vomiting, diarrhea or abdominal pain, denies chest pain or dizziness positional lightheadedness, denies sore throat or trouble swallowing, denies loss of taste or smell, or any other symptoms  Tetanus not up-to-date        Other  This is a new problem. The current episode started more than 1 month ago. The problem has been waxing and waning. Nothing aggravates the symptoms. She has tried nothing for the symptoms. The treatment provided no relief.     Skin:  Negative for erythema.    Objective:     Physical Exam   Constitutional: She is oriented to person, place, and time. She appears well-developed.   HENT:   Head: Normocephalic and atraumatic. Head is without abrasion, without contusion and without laceration.   Ears:   Right Ear: External ear normal.   Left Ear: External ear normal.   Nose: Nose normal.    Mouth/Throat: Uvula is midline, oropharynx is clear and moist and mucous membranes are normal.   Piercing noted below left lower lip, no active drainage or discharge noted, no swelling, or tenderness noted to  piercing, minimal erythema localized close to  piercing, no surrounding erythema noted      Comments: Piercing noted below left lower lip, no active drainage or discharge noted, no swelling, or tenderness noted to  piercing, minimal erythema localized close to  piercing, no surrounding erythema noted  Eyes: Conjunctivae, EOM and lids are normal. Pupils are equal, round, and reactive to light.   Neck: Trachea normal and phonation normal. Neck supple.   Cardiovascular: Normal rate, regular rhythm and normal heart sounds.   Pulmonary/Chest: Effort normal and breath sounds normal. No stridor. No respiratory distress.   Musculoskeletal: Normal range of motion.         General: Normal range of motion.   Neurological: She is alert and oriented to person, place, and time.   Skin: Skin is warm, dry, intact and no rash. Capillary refill takes less than 2 seconds. No abrasion, No burn, No bruising, No erythema and No ecchymosis   Psychiatric: Her speech is normal and behavior is normal. Judgment and thought content normal.   Nursing note and vitals reviewed.        Patient in no acute distress.  Vitals reassuring.  Discussed results/diagnosis/plan in depth with patient in clinic. Strict precautions given to patient to monitor for worsening signs and symptoms. Advised to follow up with primary.All questions answered. Strict ER precautions given. If your symptoms worsens or fail to improve you should go to the Emergency Room. Discharge and follow-up instructions given verbally/printed. Discharge and follow-up instructions discussed with the patient who expressed understanding and willingness to comply with my recommendations.Patient voiced understanding and in agreement with current treatment plan.     Please be advised  this text was dictated with [employer] software and may contain errors due to translation.    Assessment:     1. Piercing    2. Need for Tdap vaccination        Plan:       Piercing  -     Ambulatory referral/consult to Dermatology    Need for Tdap vaccination  -     (In Office Administered) Tdap Vaccine    Other orders  -     mupirocin (BACTROBAN) 2 % ointment; Apply topically 2 (two) times daily. for 5 days  Dispense: 30 g; Refill: 0          Medical Decision Making:   Urgent Care Management:  Patient in no acute distress.  Vitals reassuring.  On exam, patient is nontoxic appearing and afebrile.  Lungs CTA.  Patient with concern of possible infected piercing that she got it done in March.  She was prescribed Bactroban ointment from her primary that she did not applied prescribed.  Physical examination with localized erythema to piercing likely inflammation.  No concern for cellulitis at this time.  Tetanus updated in clinic.  I discussed with patient in detail that she likely need to follow-up with specialist for the removal of piercing if piercing is not healing.  Will prescribe Bactroban ointment and refer patient to Dermatology.  Medication prescribed and over-the-counter medication discussed with patient at length.  Proper hydration advised.  I reiterated the importance of further evaluation if no improvement symptoms and follow-up with primary.         Patient Instructions   PLEASE READ YOUR DISCHARGE INSTRUCTIONS ENTIRELY AS IT CONTAINS IMPORTANT INFORMATION.    Use the antibiotic ointment to the wound.       Please return or see your primary care doctor for signs of worsening infection (fever, worsening swelling/redness/pain, inability to move your extremity).    Please arrange follow up with your primary medical clinic as soon as possible. You must understand that you've received an Urgent Care treatment only and that you may be released before all of your medical problems are known or treated. You, the  patient, will arrange for follow up as instructed. If your symptoms worsen or fail to improve you should go to the Emergency Room.  WE CANNOT RULE OUT ALL POSSIBLE CAUSES OF YOUR SYMPTOMS IN THE URGENT CARE SETTING PLEASE GO TO THE ER IF YOU FEELS YOUR CONDITION IS WORSENING OR YOU WOULD LIKE EMERGENT EVALUATION.

## 2023-06-18 NOTE — PATIENT INSTRUCTIONS
PLEASE READ YOUR DISCHARGE INSTRUCTIONS ENTIRELY AS IT CONTAINS IMPORTANT INFORMATION.    Use the antibiotic ointment to the wound.       Please return or see your primary care doctor for signs of worsening infection (fever, worsening swelling/redness/pain, inability to move your extremity).    Please arrange follow up with your primary medical clinic as soon as possible. You must understand that you've received an Urgent Care treatment only and that you may be released before all of your medical problems are known or treated. You, the patient, will arrange for follow up as instructed. If your symptoms worsen or fail to improve you should go to the Emergency Room.  WE CANNOT RULE OUT ALL POSSIBLE CAUSES OF YOUR SYMPTOMS IN THE URGENT CARE SETTING PLEASE GO TO THE ER IF YOU FEELS YOUR CONDITION IS WORSENING OR YOU WOULD LIKE EMERGENT EVALUATION.

## 2023-10-03 ENCOUNTER — OFFICE VISIT (OUTPATIENT)
Dept: PLASTIC SURGERY | Facility: CLINIC | Age: 23
End: 2023-10-03
Payer: COMMERCIAL

## 2023-10-03 DIAGNOSIS — Z09 SURGERY FOLLOW-UP: Primary | ICD-10-CM

## 2023-10-03 PROBLEM — N62 MACROMASTIA: Status: RESOLVED | Noted: 2022-11-20 | Resolved: 2023-10-03

## 2023-10-03 PROBLEM — M54.6 CHRONIC BILATERAL THORACIC BACK PAIN: Status: RESOLVED | Noted: 2022-11-20 | Resolved: 2023-10-03

## 2023-10-03 PROBLEM — G89.29 CHRONIC BILATERAL THORACIC BACK PAIN: Status: RESOLVED | Noted: 2022-11-20 | Resolved: 2023-10-03

## 2023-10-03 PROCEDURE — 99212 OFFICE O/P EST SF 10 MIN: CPT | Mod: S$GLB,,, | Performed by: SURGERY

## 2023-10-03 PROCEDURE — 99212 PR OFFICE/OUTPT VISIT, EST, LEVL II, 10-19 MIN: ICD-10-PCS | Mod: S$GLB,,, | Performed by: SURGERY

## 2023-10-03 NOTE — PROGRESS NOTES
Bibiana Suero presents to [facility] for a follow up visit status post BBR 12/9/22. She is doing well today with no issues since her last visit. She is happy with the size and contour of her breasts. The left nipple doesn't have sensation and the right nipple has diminished sensation. She denies fever, chills, nausea, vomiting or other systemic signs of infection.       ROS - negative, other than stated above    PHYSICAL EXAMINATION  R breast - incision well healed with no issues, nipple viable  L breast - incision well healed with no issues, nipple viable, overall size slightly larger than right breast    ASSESSMENT/PLAN  23 y.o. F s/p BBR  - Doing well, no issues.   - Counseled on scar massage and scar care  - Follow up as needed      All questions were answered. The patient was advised to contact the clinic with any questions or concerns prior to their next visit.       Beba Portillo PA-C  Plastic and Reconstructive Surgery

## 2023-12-19 ENCOUNTER — PATIENT MESSAGE (OUTPATIENT)
Dept: PLASTIC SURGERY | Facility: CLINIC | Age: 23
End: 2023-12-19
Payer: COMMERCIAL

## 2024-02-22 ENCOUNTER — OFFICE VISIT (OUTPATIENT)
Dept: INTERNAL MEDICINE | Facility: CLINIC | Age: 24
End: 2024-02-22
Payer: COMMERCIAL

## 2024-02-22 ENCOUNTER — HOSPITAL ENCOUNTER (OUTPATIENT)
Dept: RADIOLOGY | Facility: HOSPITAL | Age: 24
Discharge: HOME | End: 2024-02-22
Attending: NURSE PRACTITIONER
Payer: COMMERCIAL

## 2024-02-22 VITALS
SYSTOLIC BLOOD PRESSURE: 110 MMHG | BODY MASS INDEX: 21.4 KG/M2 | DIASTOLIC BLOOD PRESSURE: 60 MMHG | HEIGHT: 72 IN | WEIGHT: 158 LBS

## 2024-02-22 DIAGNOSIS — M25.562 CHRONIC PAIN OF LEFT KNEE: ICD-10-CM

## 2024-02-22 DIAGNOSIS — F64.9 GENDER DYSPHORIA: ICD-10-CM

## 2024-02-22 DIAGNOSIS — F32.9 MAJOR DEPRESSIVE DISORDER WITH SINGLE EPISODE, REMISSION STATUS UNSPECIFIED: ICD-10-CM

## 2024-02-22 DIAGNOSIS — F32.81 PMDD (PREMENSTRUAL DYSPHORIC DISORDER): ICD-10-CM

## 2024-02-22 DIAGNOSIS — Z00.00 ANNUAL PHYSICAL EXAM: Primary | ICD-10-CM

## 2024-02-22 DIAGNOSIS — G89.29 CHRONIC PAIN OF LEFT KNEE: ICD-10-CM

## 2024-02-22 DIAGNOSIS — F90.9 ATTENTION DEFICIT HYPERACTIVITY DISORDER (ADHD), UNSPECIFIED ADHD TYPE: ICD-10-CM

## 2024-02-22 DIAGNOSIS — F41.9 ANXIETY DISORDER, UNSPECIFIED TYPE: ICD-10-CM

## 2024-02-22 DIAGNOSIS — F32.5 MAJOR DEPRESSIVE DISORDER WITH SINGLE EPISODE, IN REMISSION: ICD-10-CM

## 2024-02-22 PROCEDURE — 1159F MED LIST DOCD IN RCRD: CPT | Mod: CPTII,S$GLB,, | Performed by: NURSE PRACTITIONER

## 2024-02-22 PROCEDURE — 99395 PREV VISIT EST AGE 18-39: CPT | Mod: S$GLB,,, | Performed by: NURSE PRACTITIONER

## 2024-02-22 PROCEDURE — 73562 X-RAY EXAM OF KNEE 3: CPT | Mod: TC,50

## 2024-02-22 PROCEDURE — 73562 X-RAY EXAM OF KNEE 3: CPT | Mod: 26,50,, | Performed by: RADIOLOGY

## 2024-02-22 PROCEDURE — 99999 PR PBB SHADOW E&M-EST. PATIENT-LVL III: CPT | Mod: PBBFAC,,, | Performed by: NURSE PRACTITIONER

## 2024-02-22 PROCEDURE — 3008F BODY MASS INDEX DOCD: CPT | Mod: CPTII,S$GLB,, | Performed by: NURSE PRACTITIONER

## 2024-02-22 PROCEDURE — 3074F SYST BP LT 130 MM HG: CPT | Mod: CPTII,S$GLB,, | Performed by: NURSE PRACTITIONER

## 2024-02-22 PROCEDURE — 3078F DIAST BP <80 MM HG: CPT | Mod: CPTII,S$GLB,, | Performed by: NURSE PRACTITIONER

## 2024-02-22 RX ORDER — FLUOXETINE 10 MG/1
10 CAPSULE ORAL DAILY
Qty: 30 CAPSULE | Refills: 11 | Status: SHIPPED | OUTPATIENT
Start: 2024-02-22 | End: 2024-03-18

## 2024-02-22 NOTE — PROGRESS NOTES
"Internal Medicine Annual Exam       CHIEF COMPLAINT     The patient, Bibiana Suero, who is a 23 y.o. female presents for an annual exam.    HPI   Here with c/o PMDD-   Periods are regular. Symptoms begin 7 days prior to period and resolve on day 1-2 of next cycle   C/o mood swings, SI, breast tender (improved after BBR)   Started around age 19.   Has never attempted suicide   Does not have a plan Has thoughts of "I am a bad person"     Also with h/o depression and anxiety and adhd     S/p BBR 12/9/2022    Left knee pain- has been more active lately - no swelling redness or deformity   Worse when sitting with legs crossed   X6 months       HM-  Flu-  Covid-  Tdap-6/18/2023  HPV- complete           Past Medical History:  Past Medical History:   Diagnosis Date    ADHD (attention deficit hyperactivity disorder)     Allergy     Anxiety     Depression     H/O wisdom tooth extraction     Other infectious mononucleosis without complication        Past Surgical History:   Procedure Laterality Date    TONSILLECTOMY      TOTAL REDUCTION MAMMOPLASTY Bilateral 12/9/2022    Procedure: MAMMOPLASTY, REDUCTION;  Surgeon: Allen Loyd DO;  Location: [facility];  Service: Plastics;  Laterality: Bilateral;        Family History   Problem Relation Age of Onset    Gestational diabetes Mother     Hypertension Maternal Grandmother     Diabetes Maternal Grandmother     Heart disease Paternal Grandmother     Heart failure Paternal Grandmother         Social History     Socioeconomic History    Marital status: Single   Tobacco Use    Smoking status: Never    Smokeless tobacco: Never   Substance and Sexual Activity    Alcohol use: Yes     Alcohol/week: 1.0 standard drink of alcohol     Types: 1 Glasses of wine per week     Comment: few times per year    Drug use: Yes     Types: Marijuana, Psilocybin     Comment: rarely    Sexual activity: Not Currently     Partners: Female, Male     Birth control/protection: Abstinence   Social History " Narrative    Lives with mom and step dad. 1 dog and a bearded dragon. Just added a cat.  [employer]- going to be in 12th grade     Social Determinants of Health     Financial Resource Strain: Patient Declined (2/22/2024)    Overall Financial Resource Strain (CARDIA)     Difficulty of Paying Living Expenses: Patient declined   Food Insecurity: Patient Declined (2/22/2024)    Hunger Vital Sign     Worried About Running Out of Food in the Last Year: Patient declined     Ran Out of Food in the Last Year: Patient declined   Transportation Needs: Patient Declined (2/22/2024)    PRAPARE - Transportation     Lack of Transportation (Medical): Patient declined     Lack of Transportation (Non-Medical): Patient declined   Physical Activity: Insufficiently Active (2/22/2024)    Exercise Vital Sign     Days of Exercise per Week: 2 days     Minutes of Exercise per Session: 60 min   Stress: Stress Concern Present (2/22/2024)    [employer] - Occupational Stress Questionnaire     Feeling of Stress : To some extent   Social Connections: Unknown (2/22/2024)    Social Connection and Isolation Panel [NHANES]     Frequency of Communication with Friends and Family: More than three times a week     Frequency of Social Gatherings with Friends and Family: Once a week     Active Member of Clubs or Organizations: No     Attends Club or Organization Meetings: Patient declined     Marital Status: Patient declined   Housing Stability: Unknown (2/22/2024)    Housing Stability Vital Sign     Unable to Pay for Housing in the Last Year: Patient declined     Number of Places Lived in the Last Year: 1     Unstable Housing in the Last Year: No        Social History     Tobacco Use   Smoking Status Never   Smokeless Tobacco Never        Allergies as of 02/22/2024 - Reviewed 02/22/2024   Allergen Reaction Noted    Adhesive Dermatitis and Blisters 11/20/2021          Home Medications:  Prior to Admission medications     Medication Sig Start Date End Date Taking? Authorizing Provider   acetaminophen (TYLENOL) 500 MG tablet Take 2 tablets (1,000 mg total) by mouth every 8 (eight) hours.  Patient not taking: Reported on 6/18/2023 12/9/22   Allen Loyd,    ergocalciferol (ERGOCALCIFEROL) 50,000 unit Cap TAKE 1 CAPSULE BY MOUTH ONE TIME PER WEEK  Patient not taking: Reported on 6/18/2023 10/27/22   Sheila Luis, NP   ferrous sulfate (IRON ORAL) Take by mouth once daily.    Provider, Historical   gabapentin (NEURONTIN) 300 MG capsule Take 1 capsule (300 mg total) by mouth 3 (three) times daily. for 5 days 12/9/22 12/14/22  Allen Loyd,    ibuprofen (ADVIL,MOTRIN) 800 MG tablet Take 1 tablet (800 mg total) by mouth 3 (three) times daily.  Patient not taking: Reported on 6/18/2023 12/9/22   Allen Loyd DO   Lactobacillus acidophilus (PROBIOTIC ORAL) Take by mouth once daily.    Provider, Historical   mupirocin (BACTROBAN) 2 % ointment Apply topically 3 (three) times daily. 5/4/23   Sheila Luis, NP   oxyCODONE (ROXICODONE) 5 MG immediate release tablet Take 1 tablet (5 mg total) by mouth every 4 (four) hours as needed for Pain.  Patient not taking: Reported on 6/18/2023 12/9/22   Allen Loyd DO   psyllium seed, with sugar, (FIBER ORAL) Take by mouth once daily.    Provider, Historical   UNABLE TO FIND Take by mouth once daily. Melaleuca Vitality    Provider, Historical       Review of Systems:  Review of Systems   Constitutional:  Positive for activity change. Negative for unexpected weight change.   HENT:  Negative for hearing loss, rhinorrhea and trouble swallowing.    Eyes:  Negative for discharge and visual disturbance.   Respiratory:  Negative for chest tightness and wheezing.    Cardiovascular:  Negative for chest pain and palpitations.   Gastrointestinal:  Negative for blood in stool, constipation, diarrhea and vomiting.   Endocrine: Negative for polydipsia and  "polyuria.   Genitourinary:  Positive for menstrual problem. Negative for difficulty urinating, dysuria and hematuria.   Musculoskeletal:  Positive for arthralgias. Negative for joint swelling and neck pain.   Neurological:  Negative for weakness and headaches.   Psychiatric/Behavioral:  Positive for dysphoric mood. Negative for confusion.        Health Maintainence:   Immunizations:  Health Maintenance         Date Due Completion Date    Influenza Vaccine (1) 09/01/2023 11/5/2022    COVID-19 Vaccine (4 - 2023-24 season) 09/01/2023 10/28/2022    TETANUS VACCINE 06/18/2033 6/18/2023             PHYSICAL EXAM     /60 (BP Location: Left arm, Patient Position: Sitting, BP Method: Medium (Manual))   Ht 6' 2" (1.88 m)   Wt 71.7 kg (158 lb)   BMI 20.29 kg/m²  Body mass index is 20.29 kg/m².    Physical Exam  Constitutional:       Appearance: She is well-developed.   HENT:      Head: Normocephalic and atraumatic.   Eyes:      Pupils: Pupils are equal, round, and reactive to light.   Cardiovascular:      Rate and Rhythm: Normal rate and regular rhythm.   Pulmonary:      Effort: Pulmonary effort is normal.   Musculoskeletal:      Right knee: Normal.      Left knee: Normal.        Legs:    Neurological:      Mental Status: She is alert and oriented to person, place, and time.         LABS     No results found for: "LABA1C", "HGBA1C"  CMP  Sodium   Date Value Ref Range Status   12/07/2022 140 136 - 145 mmol/L Final     Potassium   Date Value Ref Range Status   12/07/2022 4.2 3.5 - 5.1 mmol/L Final     Chloride   Date Value Ref Range Status   12/07/2022 110 95 - 110 mmol/L Final     CO2   Date Value Ref Range Status   12/07/2022 22 (L) 23 - 29 mmol/L Final     Glucose   Date Value Ref Range Status   12/07/2022 108 70 - 110 mg/dL Final     BUN   Date Value Ref Range Status   12/07/2022 11 6 - 20 mg/dL Final     Creatinine   Date Value Ref Range Status   12/07/2022 0.7 0.5 - 1.4 mg/dL Final     Calcium   Date Value Ref " Range Status   12/07/2022 9.5 8.7 - 10.5 mg/dL Final     Total Protein   Date Value Ref Range Status   12/07/2022 7.1 6.0 - 8.4 g/dL Final     Albumin   Date Value Ref Range Status   12/07/2022 4.5 3.5 - 5.2 g/dL Final     Total Bilirubin   Date Value Ref Range Status   12/07/2022 0.6 0.1 - 1.0 mg/dL Final     Comment:     For infants and newborns, interpretation of results should be based  on gestational age, weight and in agreement with clinical  observations.    Premature Infant recommended reference ranges:  Up to 24 hours.............<8.0 mg/dL  Up to 48 hours............<12.0 mg/dL  3-5 days..................<15.0 mg/dL  6-29 days.................<15.0 mg/dL       Alkaline Phosphatase   Date Value Ref Range Status   12/07/2022 62 55 - 135 U/L Final     AST   Date Value Ref Range Status   12/07/2022 11 10 - 40 U/L Final     ALT   Date Value Ref Range Status   12/07/2022 13 10 - 44 U/L Final     Anion Gap   Date Value Ref Range Status   12/07/2022 8 8 - 16 mmol/L Final     eGFR if    Date Value Ref Range Status   01/13/2012 >60 >60 mL/min Final     Comment:     Estimated glomerular filtration rate (eGFR) is normalized to an  average body surface area of 1.73 square meters.  The calculation  used to obtain the eGFR is the adjusted MDRD equation, which factors  patient sex, age, race, and creatinine result.  Since race is unknown  in our information system, the eGFR values for -American  and Non--American patients are given for each creatinine  result.     eGFR if non    Date Value Ref Range Status   01/13/2012 >60 >60 mL/min Final     Lab Results   Component Value Date    WBC 5.92 12/07/2022    HGB 14.3 12/07/2022    HCT 40.3 12/07/2022    MCV 93 12/07/2022     12/07/2022     Lab Results   Component Value Date    CHOL 185 08/06/2022     Lab Results   Component Value Date    HDL 47 08/06/2022     Lab Results   Component Value Date    LDLCALC 124.6 08/06/2022      Lab Results   Component Value Date    TRIG 67 08/06/2022     Lab Results   Component Value Date    CHOLHDL 25.4 08/06/2022     Lab Results   Component Value Date    TSH 1.470 08/06/2022       ASSESSMENT/PLAN     Bibiana Suero is a 23 y.o. female    Annual physical exam- All age and gender related screenings discussed     PMDD (premenstrual dysphoric disorder)- with predictable timing of symptoms. Will start fluoxetine 10mg daily during luteal phase. Will monitor, VV in 3 months   -     FLUoxetine 10 MG capsule; Take 1 capsule (10 mg total) by mouth once daily.  Dispense: 30 capsule; Refill: 11    Chronic pain of left knee- new onset, will send for imaging   -     X-ray Knee Ortho Bilateral; Future; Expected date: 02/22/2024    Major depressive disorder with single episode, remission status unspecified    Attention deficit hyperactivity disorder (ADHD), unspecified ADHD type    Anxiety disorder, unspecified type    Gender dysphoria    Major depressive disorder with single episode, in remission           Follow up with PCP    Sheila Castrejon-John BONILLA, APRN, FNP-c   Department of Internal Medicine - [facility]  3:27 PM

## 2024-03-16 DIAGNOSIS — F32.81 PMDD (PREMENSTRUAL DYSPHORIC DISORDER): ICD-10-CM

## 2024-03-18 RX ORDER — FLUOXETINE 10 MG/1
10 CAPSULE ORAL
Qty: 90 CAPSULE | Refills: 0 | Status: SHIPPED | OUTPATIENT
Start: 2024-03-18

## 2024-05-17 ENCOUNTER — TELEPHONE (OUTPATIENT)
Dept: URGENT CARE | Facility: CLINIC | Age: 24
End: 2024-05-17

## 2024-05-22 ENCOUNTER — OFFICE VISIT (OUTPATIENT)
Dept: INTERNAL MEDICINE | Facility: CLINIC | Age: 24
End: 2024-05-22

## 2024-05-22 DIAGNOSIS — F32.81 PMDD (PREMENSTRUAL DYSPHORIC DISORDER): Primary | ICD-10-CM

## 2024-05-22 PROCEDURE — 99213 OFFICE O/P EST LOW 20 MIN: CPT | Mod: 95,,, | Performed by: NURSE PRACTITIONER

## 2024-05-22 NOTE — PROGRESS NOTES
"INTERNAL MEDICINE PROGRESS NOTE    CHIEF COMPLAINT     Chief Complaint   Patient presents with    Premenstrual Syndrome    Anxiety       HPI     Bibiana Suero is a 23 y.o. female who presents for a follow up visit today.    The patient location is: [place], [place]   The chief complaint leading to consultation is: PMDD    Visit type: audiovisual    Face to Face time with patient: 10 minutes of total time spent on the encounter, which includes face to face time and non-face to face time preparing to see the patient (eg, review of tests), Obtaining and/or reviewing separately obtained history, Documenting clinical information in the electronic or other health record, Independently interpreting results (not separately reported) and communicating results to the patient/family/caregiver, or Care coordination (not separately reported).         Each patient to whom he or she provides medical services by telemedicine is:  (1) informed of the relationship between the physician and patient and the respective role of any other health care provider with respect to management of the patient; and (2) notified that he or she may decline to receive medical services by telemedicine and may withdraw from such care at any time.    Notes:      Pt seen annual 2/22/2024 - at visit discussed symptoms of PMDD   "Periods are regular. Symptoms begin 7 days prior to period and resolve on day 1-2 of next cycle   C/o mood swings, SI, breast tender (improved after BBR)   Started around age 19. "    Started on fluoxetine 10mg during luteal phase - states the medication has reassured her tht she has PMDD. It is helping decrease the intensity of her anxiety overall but especailly around her period. "Less like I want to rip someone's head off". "I'm a much better person to be around"           Past Medical History:  Past Medical History:   Diagnosis Date    ADHD (attention deficit hyperactivity disorder)     Allergy     Anxiety     Depression     H/O wisdom " tooth extraction     Other infectious mononucleosis without complication        Home Medications:  Prior to Admission medications    Medication Sig Start Date End Date Taking? Authorizing Provider   acetaminophen (TYLENOL) 500 MG tablet Take 2 tablets (1,000 mg total) by mouth every 8 (eight) hours. 12/9/22   Allen Loyd, DO   ergocalciferol (ERGOCALCIFEROL) 50,000 unit Cap TAKE 1 CAPSULE BY MOUTH ONE TIME PER WEEK 10/27/22   Sheila Luis NP   ferrous sulfate (IRON ORAL) Take by mouth once daily.    Provider, Historical   FLUoxetine 10 MG capsule TAKE 1 CAPSULE BY MOUTH EVERY DAY 3/18/24   Smita Amador MD   ibuprofen (ADVIL,MOTRIN) 800 MG tablet Take 1 tablet (800 mg total) by mouth 3 (three) times daily. 12/9/22   Allen Loyd, DO   Lactobacillus acidophilus (PROBIOTIC ORAL) Take by mouth once daily.    Provider, Historical   psyllium seed, with sugar, (FIBER ORAL) Take by mouth once daily.    Provider, Historical   UNABLE TO FIND Take by mouth once daily. Melaleuca Vitality    Provider, Historical       Review of Systems:  Review of Systems   Constitutional:  Negative for activity change and unexpected weight change.   HENT:  Positive for rhinorrhea. Negative for hearing loss and trouble swallowing.    Eyes:  Negative for discharge and visual disturbance.   Respiratory:  Negative for chest tightness and wheezing.    Cardiovascular:  Negative for chest pain and palpitations.   Gastrointestinal:  Negative for blood in stool, constipation, diarrhea and vomiting.   Endocrine: Negative for polydipsia and polyuria.   Genitourinary:  Negative for difficulty urinating, dysuria, hematuria and menstrual problem.   Musculoskeletal:  Positive for arthralgias. Negative for joint swelling and neck pain.   Neurological:  Positive for headaches. Negative for weakness.   Psychiatric/Behavioral:  Positive for dysphoric mood. Negative for confusion.        Health Maintainence:   Immunizations:  Health  "Maintenance         Date Due Completion Date    COVID-19 Vaccine (4 - 2023-24 season) 09/01/2023 10/28/2022    Influenza Vaccine (Season Ended) 09/01/2024 11/5/2022    TETANUS VACCINE 06/18/2033 6/18/2023             PHYSICAL EXAM     There were no vitals taken for this visit.    Physical Exam  Constitutional:       Appearance: Normal appearance. She is normal weight.   Pulmonary:      Effort: No respiratory distress.   Neurological:      General: No focal deficit present.      Mental Status: She is alert and oriented to person, place, and time.         LABS     No results found for: "LABA1C", "HGBA1C"  CMP  Sodium   Date Value Ref Range Status   12/07/2022 140 136 - 145 mmol/L Final     Potassium   Date Value Ref Range Status   12/07/2022 4.2 3.5 - 5.1 mmol/L Final     Chloride   Date Value Ref Range Status   12/07/2022 110 95 - 110 mmol/L Final     CO2   Date Value Ref Range Status   12/07/2022 22 (L) 23 - 29 mmol/L Final     Glucose   Date Value Ref Range Status   12/07/2022 108 70 - 110 mg/dL Final     BUN   Date Value Ref Range Status   12/07/2022 11 6 - 20 mg/dL Final     Creatinine   Date Value Ref Range Status   12/07/2022 0.7 0.5 - 1.4 mg/dL Final     Calcium   Date Value Ref Range Status   12/07/2022 9.5 8.7 - 10.5 mg/dL Final     Total Protein   Date Value Ref Range Status   12/07/2022 7.1 6.0 - 8.4 g/dL Final     Albumin   Date Value Ref Range Status   12/07/2022 4.5 3.5 - 5.2 g/dL Final     Total Bilirubin   Date Value Ref Range Status   12/07/2022 0.6 0.1 - 1.0 mg/dL Final     Comment:     For infants and newborns, interpretation of results should be based  on gestational age, weight and in agreement with clinical  observations.    Premature Infant recommended reference ranges:  Up to 24 hours.............<8.0 mg/dL  Up to 48 hours............<12.0 mg/dL  3-5 days..................<15.0 mg/dL  6-29 days.................<15.0 mg/dL       Alkaline Phosphatase   Date Value Ref Range Status   12/07/2022 62 " 55 - 135 U/L Final     AST   Date Value Ref Range Status   12/07/2022 11 10 - 40 U/L Final     ALT   Date Value Ref Range Status   12/07/2022 13 10 - 44 U/L Final     Anion Gap   Date Value Ref Range Status   12/07/2022 8 8 - 16 mmol/L Final     eGFR if    Date Value Ref Range Status   01/13/2012 >60 >60 mL/min Final     Comment:     Estimated glomerular filtration rate (eGFR) is normalized to an  average body surface area of 1.73 square meters.  The calculation  used to obtain the eGFR is the adjusted MDRD equation, which factors  patient sex, age, race, and creatinine result.  Since race is unknown  in our information system, the eGFR values for -American  and Non--American patients are given for each creatinine  result.     eGFR if non    Date Value Ref Range Status   01/13/2012 >60 >60 mL/min Final     Lab Results   Component Value Date    WBC 5.92 12/07/2022    HGB 14.3 12/07/2022    HCT 40.3 12/07/2022    MCV 93 12/07/2022     12/07/2022     Lab Results   Component Value Date    CHOL 185 08/06/2022     Lab Results   Component Value Date    HDL 47 08/06/2022     Lab Results   Component Value Date    LDLCALC 124.6 08/06/2022     Lab Results   Component Value Date    TRIG 67 08/06/2022     Lab Results   Component Value Date    CHOLHDL 25.4 08/06/2022     Lab Results   Component Value Date    TSH 1.470 08/06/2022       ASSESSMENT/PLAN     Bibiana Suero is a 23 y.o. female     PMDD (premenstrual dysphoric disorder)- stable. Will cont fluoxetine 10mg daily during luteal phase and monitor       Follow up with PCP in February for annual or before if needed     Patient education provided from [facility]. Patient was counseled on when and how to seek emergent care.       Sheila BONILLA, APRN, FNP-c   Department of Internal Medicine - [facility]  7:44 AM

## 2025-03-25 DIAGNOSIS — F32.81 PMDD (PREMENSTRUAL DYSPHORIC DISORDER): ICD-10-CM

## 2025-03-25 RX ORDER — FLUOXETINE 10 MG/1
10 CAPSULE ORAL
Qty: 30 CAPSULE | Refills: 0 | Status: SHIPPED | OUTPATIENT
Start: 2025-03-25

## 2025-04-20 DIAGNOSIS — F32.81 PMDD (PREMENSTRUAL DYSPHORIC DISORDER): ICD-10-CM

## 2025-04-21 RX ORDER — FLUOXETINE 10 MG/1
10 CAPSULE ORAL
Qty: 30 CAPSULE | Refills: 0 | Status: SHIPPED | OUTPATIENT
Start: 2025-04-21

## 2025-05-19 ENCOUNTER — OFFICE VISIT (OUTPATIENT)
Dept: URGENT CARE | Facility: CLINIC | Age: 25
End: 2025-05-19
Payer: MEDICAID

## 2025-05-19 VITALS
BODY MASS INDEX: 21.41 KG/M2 | WEIGHT: 158.06 LBS | HEART RATE: 77 BPM | RESPIRATION RATE: 18 BRPM | TEMPERATURE: 98 F | DIASTOLIC BLOOD PRESSURE: 73 MMHG | OXYGEN SATURATION: 97 % | HEIGHT: 72 IN | SYSTOLIC BLOOD PRESSURE: 108 MMHG

## 2025-05-19 DIAGNOSIS — H10.9 BACTERIAL CONJUNCTIVITIS: ICD-10-CM

## 2025-05-19 DIAGNOSIS — H10.13 ALLERGIC CONJUNCTIVITIS OF BOTH EYES: Primary | ICD-10-CM

## 2025-05-19 PROCEDURE — 99213 OFFICE O/P EST LOW 20 MIN: CPT | Mod: S$GLB,,, | Performed by: NURSE PRACTITIONER

## 2025-05-19 RX ORDER — MOXIFLOXACIN 5 MG/ML
1 SOLUTION/ DROPS OPHTHALMIC 3 TIMES DAILY
Qty: 3 ML | Refills: 0 | Status: SHIPPED | OUTPATIENT
Start: 2025-05-19

## 2025-05-19 RX ORDER — AZELASTINE HYDROCHLORIDE 0.5 MG/ML
1 SOLUTION/ DROPS OPHTHALMIC 2 TIMES DAILY
Qty: 6 ML | Refills: 0 | Status: SHIPPED | OUTPATIENT
Start: 2025-05-19 | End: 2026-05-19

## 2025-05-19 NOTE — PROGRESS NOTES
"Subjective:      Patient ID: Bibiana Suero is a 24 y.o. female.    Vitals:  height is 6' 2" (1.88 m) and weight is 71.7 kg (158 lb 1.1 oz). Her oral temperature is 98.3 °F (36.8 °C). Her blood pressure is 108/73 and her pulse is 77. Her respiration is 18 and oxygen saturation is 97%.     Chief Complaint: Eye Problem    Pt presents today with eye problem of both eyes, redness, drainage green yellowsh, chills, sx started yesterday, tx: mucinexD, eye drops  Did have cough congestion but that since gone away and felt better, yesterday the eyes started    Provider note begins here:   Patient is a 24-year-old female here with complaints of bilateral eye redness, green-yellow drainage x1 day.  Also has some body aches, congestion, and chills yesterday but no longer present today.  She does report a history of seasonal allergy    Eye Problem   Both eyes are affected. This is a new problem. The current episode started yesterday. The problem occurs constantly. The problem has been gradually worsening. There was no injury mechanism. The pain is at a severity of 2/10. There is No known exposure to pink eye. She Does not wear contacts. Associated symptoms include blurred vision, an eye discharge, eye redness and itching. Pertinent negatives include no fever, foreign body sensation, nausea, photophobia or recent URI. She has tried commercial eye wash for the symptoms. The treatment provided no relief.       Constitution: Negative for fever.   Eyes:  Positive for eye discharge, eye itching, eye redness and blurred vision. Negative for photophobia.   Gastrointestinal:  Negative for nausea.      Objective:     Physical Exam   Constitutional: She is oriented to person, place, and time. She appears well-developed.   HENT:   Head: Normocephalic and atraumatic.   Ears:   Right Ear: External ear normal.   Left Ear: External ear normal.   Nose: Nose normal.   Mouth/Throat: Oropharynx is clear and moist.   Eyes: EOM and lids are normal. " Pupils are equal, round, and reactive to light. Right eye exhibits no chemosis, no discharge, no exudate and no hordeolum. No foreign body present in the right eye. Left eye exhibits no chemosis, no discharge, no exudate and no hordeolum. No foreign body present in the left eye. Right conjunctiva is injected. Right conjunctiva has no hemorrhage. Left conjunctiva is injected. Left conjunctiva has no hemorrhage. Right eye exhibits normal extraocular motion and no nystagmus. Left eye exhibits no nystagmus.   Neck: Trachea normal and phonation normal. Neck supple.   Musculoskeletal: Normal range of motion.         General: Normal range of motion.   Neurological: She is alert and oriented to person, place, and time.   Skin: Skin is warm, dry and intact.   Psychiatric: Her speech is normal and behavior is normal. Judgment and thought content normal.   Nursing note and vitals reviewed.      Assessment:     1. Allergic conjunctivitis of both eyes    2. Bacterial conjunctivitis      Vision Screening    Right eye Left eye Both eyes   Without correction      With correction 20/30 20/25 20/25       Plan:       Allergic conjunctivitis of both eyes  -     azelastine (OPTIVAR) 0.05 % ophthalmic solution; Place 1 drop into both eyes 2 (two) times daily.  Dispense: 6 mL; Refill: 0    Bacterial conjunctivitis  -     moxifloxacin (VIGAMOX) 0.5 % ophthalmic solution; Place 1 drop into both eyes 3 (three) times daily.  Dispense: 3 mL; Refill: 0      Discussed with patient that it appears or allergic in nature due to it occurring in both eyes and she has itching with it as well.  Also may be viral due to her recent URI symptoms.  We will treat her for possible allergic and bacterial conjunctivitis.  Provided work note to return May 21st.  Reminded on proper hand hygiene.    Patient Instructions   Azelastine- allergy eye drops- twice a day for itchy eyes    Moxifloxacin- antibiotic eyedrops- every 8 hours or bacterial pinkeye    When you  use eye drops, take care not to touch the bottle or dropper to your eye.  If you wear contact lenses, you will need to stop wearing them for a short time until your symptoms go away. If your contacts are disposable, start with fresh ones after your eye gets better. If not, clean your contacts with care. Clean your contact case thoroughly or get a new one.  Avoid sharing towels, washcloths, bedding, or personal items when you have pink eye.      Common side effects include stinging and burning upon instillation. Patients may find it helpful to refrigerate the drops and/or use refrigerated artificial tears before using these medications. Other adverse effects include headache and increased ocular dryness.       Please remember that you have received care at an urgent care today. Urgent cares are not emergency rooms and are not equipped to handle life threatening emergencies and cannot rule in or out certain medical conditions and you may be released before all of your medical problems are known or treated.     Please arrange follow up with your primary care physician or speciality clinic within 2-5 days if your signs and symptoms have not resolved or worsen.     Patient can call our Referral Hotline at [phone] to make an appointment.      Please return here or go to the Emergency Department for any concerns or worsening of condition.   (Worsening vision, eye pain, sensitivity to light, increased eye discharge)

## 2025-05-19 NOTE — PATIENT INSTRUCTIONS
Azelastine- allergy eye drops- twice a day for itchy eyes    Moxifloxacin- antibiotic eyedrops- every 8 hours or bacterial pinkeye    When you use eye drops, take care not to touch the bottle or dropper to your eye.  If you wear contact lenses, you will need to stop wearing them for a short time until your symptoms go away. If your contacts are disposable, start with fresh ones after your eye gets better. If not, clean your contacts with care. Clean your contact case thoroughly or get a new one.  Avoid sharing towels, washcloths, bedding, or personal items when you have pink eye.      Common side effects include stinging and burning upon instillation. Patients may find it helpful to refrigerate the drops and/or use refrigerated artificial tears before using these medications. Other adverse effects include headache and increased ocular dryness.       Please remember that you have received care at an urgent care today. Urgent cares are not emergency rooms and are not equipped to handle life threatening emergencies and cannot rule in or out certain medical conditions and you may be released before all of your medical problems are known or treated.     Please arrange follow up with your primary care physician or speciality clinic within 2-5 days if your signs and symptoms have not resolved or worsen.     Patient can call our Referral Hotline at [phone] to make an appointment.      Please return here or go to the Emergency Department for any concerns or worsening of condition.   (Worsening vision, eye pain, sensitivity to light, increased eye discharge)

## 2025-05-19 NOTE — LETTER
May 19, 2025      [facility] [address]  [place] [address]  Phone: [phone]  Fax: [phone]       Patient: Bibiana Suero   YOB: 2000  Date of Visit: 05/19/2025    To Whom It May Concern:    Juan Suero  was at [facility] on 05/19/2025. The patient may return to work/school on 5/21/2025 with no restrictions. If you have any questions or concerns, or if I can be of further assistance, please do not hesitate to contact me.    Sincerely,      Lizette Crum, NP

## 2025-05-22 ENCOUNTER — OFFICE VISIT (OUTPATIENT)
Dept: INTERNAL MEDICINE | Facility: CLINIC | Age: 25
End: 2025-05-22
Payer: MEDICAID

## 2025-05-22 VITALS
DIASTOLIC BLOOD PRESSURE: 70 MMHG | HEART RATE: 78 BPM | OXYGEN SATURATION: 97 % | SYSTOLIC BLOOD PRESSURE: 94 MMHG | HEIGHT: 72 IN | WEIGHT: 168 LBS | BODY MASS INDEX: 22.75 KG/M2

## 2025-05-22 DIAGNOSIS — Z00.00 ANNUAL PHYSICAL EXAM: Primary | ICD-10-CM

## 2025-05-22 DIAGNOSIS — F90.9 ATTENTION DEFICIT HYPERACTIVITY DISORDER (ADHD), UNSPECIFIED ADHD TYPE: ICD-10-CM

## 2025-05-22 DIAGNOSIS — M25.50 ARTHRALGIA, UNSPECIFIED JOINT: ICD-10-CM

## 2025-05-22 DIAGNOSIS — M79.89 LEFT UPPER EXTREMITY SWELLING: ICD-10-CM

## 2025-05-22 DIAGNOSIS — F32.9 MAJOR DEPRESSIVE DISORDER WITH SINGLE EPISODE, REMISSION STATUS UNSPECIFIED: ICD-10-CM

## 2025-05-22 DIAGNOSIS — F32.81 PMDD (PREMENSTRUAL DYSPHORIC DISORDER): ICD-10-CM

## 2025-05-22 DIAGNOSIS — R55 POSTURAL DIZZINESS WITH PRESYNCOPE: ICD-10-CM

## 2025-05-22 DIAGNOSIS — R23.9 SKIN CHANGE: ICD-10-CM

## 2025-05-22 DIAGNOSIS — R00.2 HEART PALPITATIONS: ICD-10-CM

## 2025-05-22 DIAGNOSIS — R42 POSTURAL DIZZINESS WITH PRESYNCOPE: ICD-10-CM

## 2025-05-22 DIAGNOSIS — F41.9 ANXIETY DISORDER, UNSPECIFIED TYPE: ICD-10-CM

## 2025-05-22 DIAGNOSIS — F64.9 GENDER DYSPHORIA: ICD-10-CM

## 2025-05-22 PROCEDURE — 1159F MED LIST DOCD IN RCRD: CPT | Mod: CPTII,,, | Performed by: NURSE PRACTITIONER

## 2025-05-22 PROCEDURE — 99213 OFFICE O/P EST LOW 20 MIN: CPT | Mod: PBBFAC | Performed by: NURSE PRACTITIONER

## 2025-05-22 PROCEDURE — 99395 PREV VISIT EST AGE 18-39: CPT | Mod: S$PBB,,, | Performed by: NURSE PRACTITIONER

## 2025-05-22 PROCEDURE — 99999 PR PBB SHADOW E&M-EST. PATIENT-LVL III: CPT | Mod: PBBFAC,,, | Performed by: NURSE PRACTITIONER

## 2025-05-22 PROCEDURE — 3078F DIAST BP <80 MM HG: CPT | Mod: CPTII,,, | Performed by: NURSE PRACTITIONER

## 2025-05-22 PROCEDURE — 3008F BODY MASS INDEX DOCD: CPT | Mod: CPTII,,, | Performed by: NURSE PRACTITIONER

## 2025-05-22 PROCEDURE — 3074F SYST BP LT 130 MM HG: CPT | Mod: CPTII,,, | Performed by: NURSE PRACTITIONER

## 2025-05-22 RX ORDER — ESCITALOPRAM OXALATE 5 MG/1
5 TABLET ORAL DAILY
Qty: 90 TABLET | Refills: 3 | Status: SHIPPED | OUTPATIENT
Start: 2025-05-22 | End: 2026-05-22

## 2025-05-22 NOTE — PROGRESS NOTES
"Internal Medicine Annual Exam       CHIEF COMPLAINT     The patient, Bibiana Suero, who is a 24 y.o. female presents for an annual exam.    HPI   Here for annual - last seen 5/22/2024    PMDD-   Periods are regular. Symptoms begin 7 days prior to period and resolve on day 1-2 of next cycle   C/o mood swings, SI, breast tender (improved after BBR)   Started around age 19.   Has never attempted suicide   Does not have a plan Has thoughts of "I am a bad person"   Started fluoxetine daily      Today she reports fluoxetine is causing drowsiness, trouble waking up in the morning - alternated dosing to 12 hours prior to waking up   Decreased libido and trouble climaxing  Excessive sweating and over heating   Vivid dreams - ranging from strange to gory dreams   Anxiety - "moderate to severe" with panic attacks        Left knee pain- has been more active lately - no swelling redness or deformity   Worse when sitting with legs crossed   Also c/o pain to "all" of her joints. Stating will feel a joint that is in pain at all times.   Reports some hyperflexive joints to the elbows and hands     Dizziness and black spots in vision and trouble balancing when standing for over 2 minutes - states she needs to use a cane for ballance when standing   Fatigue   Blood pooling in the hands and feet - hands and feet feel heavy when active   X 5 years       Past Medical History:  Past Medical History:   Diagnosis Date    ADHD (attention deficit hyperactivity disorder)     Allergy     Anxiety     Depression     H/O wisdom tooth extraction     Other infectious mononucleosis without complication        Past Surgical History:   Procedure Laterality Date    TONSILLECTOMY      TOTAL REDUCTION MAMMOPLASTY Bilateral 12/9/2022    Procedure: MAMMOPLASTY, REDUCTION;  Surgeon: Allen Loyd DO;  Location: [facility];  Service: Plastics;  Laterality: Bilateral;        Family History   Problem Relation Name Age of Onset    Gestational diabetes " Mother cherie     Hypertension Maternal Grandmother renny mcnair     Diabetes Maternal Grandmother renny mcnair     Heart disease Paternal Grandmother      Heart failure Paternal Grandmother          Social History[1]     Tobacco Use History[2]     Allergies as of 05/22/2025 - Reviewed 05/22/2025   Allergen Reaction Noted    Adhesive Dermatitis and Blisters 11/20/2021          Home Medications:  Prior to Admission medications    Medication Sig Start Date End Date Taking? Authorizing Provider   acetaminophen (TYLENOL) 500 MG tablet Take 2 tablets (1,000 mg total) by mouth every 8 (eight) hours.  Patient not taking: Reported on 5/19/2025 12/9/22   Allen Loyd,    azelastine (OPTIVAR) 0.05 % ophthalmic solution Place 1 drop into both eyes 2 (two) times daily. 5/19/25 5/19/26  Lizette Crum NP   ergocalciferol (ERGOCALCIFEROL) 50,000 unit Cap TAKE 1 CAPSULE BY MOUTH ONE TIME PER WEEK  Patient not taking: Reported on 5/19/2025 10/27/22   Sheila Luis NP   ferrous sulfate (IRON ORAL) Take by mouth once daily.  Patient not taking: Reported on 5/19/2025    Provider, Historical   FLUoxetine 10 MG capsule TAKE 1 CAPSULE BY MOUTH EVERY DAY 4/21/25   Sheila Luis NP   ibuprofen (ADVIL,MOTRIN) 800 MG tablet Take 1 tablet (800 mg total) by mouth 3 (three) times daily.  Patient not taking: Reported on 5/19/2025 12/9/22   Allen Loyd DO   Lactobacillus acidophilus (PROBIOTIC ORAL) Take by mouth once daily.  Patient not taking: Reported on 5/19/2025    Provider, Historical   moxifloxacin (VIGAMOX) 0.5 % ophthalmic solution Place 1 drop into both eyes 3 (three) times daily. 5/19/25   Lizette Crum NP   psyllium seed, with sugar, (FIBER ORAL) Take by mouth once daily.  Patient not taking: Reported on 5/19/2025    Provider, Historical   UNABLE TO FIND Take by mouth once daily. Melaleuca Vitality  Patient not taking: Reported on 5/19/2025    Provider, Historical       Review of  "Systems:  Review of Systems   Constitutional:  Positive for diaphoresis (see HPI). Negative for chills, fatigue, fever and unexpected weight change.   HENT:  Negative for congestion, hearing loss, rhinorrhea and sinus pressure.    Eyes:  Negative for pain, redness and visual disturbance.   Respiratory:  Negative for cough, shortness of breath and wheezing.    Cardiovascular:  Positive for palpitations. Negative for chest pain and leg swelling.   Gastrointestinal:  Negative for abdominal distention, abdominal pain, constipation, diarrhea, nausea and vomiting.   Endocrine: Positive for heat intolerance. Negative for cold intolerance, polydipsia, polyphagia and polyuria.   Genitourinary:  Negative for dysuria, frequency, urgency and vaginal discharge.   Musculoskeletal:  Positive for arthralgias. Negative for gait problem and myalgias.   Skin:  Negative for color change and rash.   Allergic/Immunologic: Negative for environmental allergies and immunocompromised state.   Neurological:  Positive for dizziness and light-headedness. Negative for syncope, weakness and headaches.   Hematological:  Negative for adenopathy. Does not bruise/bleed easily.   Psychiatric/Behavioral:  Positive for sleep disturbance. Negative for confusion and dysphoric mood. The patient is nervous/anxious.        Health Maintainence:   Immunizations:  Health Maintenance         Date Due Completion Date    COVID-19 Vaccine (4 - 2024-25 season) 09/01/2024 10/28/2022    Influenza Vaccine (Season Ended) 09/01/2025 11/5/2022    TETANUS VACCINE 06/18/2033 6/18/2023    RSV Vaccine (Age 60+ and Pregnant patients) (1 - 1-dose 75+ series) 09/14/2077 ---             PHYSICAL EXAM     BP 94/70 (BP Location: Left arm, Patient Position: Sitting)   Pulse 78   Ht 6' 2" (1.88 m)   Wt 76.2 kg (167 lb 15.9 oz)   LMP 04/30/2025   SpO2 97%   BMI 21.57 kg/m²  Body mass index is 21.57 kg/m².    Physical Exam  Vitals reviewed.   Constitutional:       Appearance: She " "is well-developed.   HENT:      Head: Normocephalic.      Right Ear: External ear normal.      Left Ear: External ear normal.      Nose: Nose normal.      Mouth/Throat:      Pharynx: No oropharyngeal exudate.   Eyes:      Pupils: Pupils are equal, round, and reactive to light.   Neck:      Thyroid: No thyromegaly.      Vascular: No JVD.      Trachea: No tracheal deviation.   Cardiovascular:      Rate and Rhythm: Normal rate and regular rhythm.      Heart sounds: Normal heart sounds. No murmur heard.     No friction rub. No gallop.   Pulmonary:      Effort: Pulmonary effort is normal. No respiratory distress.      Breath sounds: Normal breath sounds. No wheezing or rales.   Abdominal:      General: Bowel sounds are normal. There is no distension.      Palpations: Abdomen is soft.      Tenderness: There is no abdominal tenderness.   Musculoskeletal:         General: No tenderness. Normal range of motion.      Cervical back: Neck supple.   Lymphadenopathy:      Cervical: No cervical adenopathy.   Skin:     General: Skin is warm and dry.      Findings: No rash.   Neurological:      Mental Status: She is alert and oriented to person, place, and time.   Psychiatric:         Behavior: Behavior normal.         LABS     No results found for: "LABA1C", "HGBA1C"  CMP  Sodium   Date Value Ref Range Status   12/07/2022 140 136 - 145 mmol/L Final     Potassium   Date Value Ref Range Status   12/07/2022 4.2 3.5 - 5.1 mmol/L Final     Chloride   Date Value Ref Range Status   12/07/2022 110 95 - 110 mmol/L Final     CO2   Date Value Ref Range Status   12/07/2022 22 (L) 23 - 29 mmol/L Final     Glucose   Date Value Ref Range Status   12/07/2022 108 70 - 110 mg/dL Final     BUN   Date Value Ref Range Status   12/07/2022 11 6 - 20 mg/dL Final     Creatinine   Date Value Ref Range Status   12/07/2022 0.7 0.5 - 1.4 mg/dL Final     Calcium   Date Value Ref Range Status   12/07/2022 9.5 8.7 - 10.5 mg/dL Final     Total Protein   Date Value " Ref Range Status   12/07/2022 7.1 6.0 - 8.4 g/dL Final     Albumin   Date Value Ref Range Status   12/07/2022 4.5 3.5 - 5.2 g/dL Final     Total Bilirubin   Date Value Ref Range Status   12/07/2022 0.6 0.1 - 1.0 mg/dL Final     Comment:     For infants and newborns, interpretation of results should be based  on gestational age, weight and in agreement with clinical  observations.    Premature Infant recommended reference ranges:  Up to 24 hours.............<8.0 mg/dL  Up to 48 hours............<12.0 mg/dL  3-5 days..................<15.0 mg/dL  6-29 days.................<15.0 mg/dL       Alkaline Phosphatase   Date Value Ref Range Status   12/07/2022 62 55 - 135 U/L Final     AST   Date Value Ref Range Status   12/07/2022 11 10 - 40 U/L Final     ALT   Date Value Ref Range Status   12/07/2022 13 10 - 44 U/L Final     Anion Gap   Date Value Ref Range Status   12/07/2022 8 8 - 16 mmol/L Final     eGFR if    Date Value Ref Range Status   01/13/2012 >60 >60 mL/min Final     Comment:     Estimated glomerular filtration rate (eGFR) is normalized to an  average body surface area of 1.73 square meters.  The calculation  used to obtain the eGFR is the adjusted MDRD equation, which factors  patient sex, age, race, and creatinine result.  Since race is unknown  in our information system, the eGFR values for -American  and Non--American patients are given for each creatinine  result.     eGFR if non    Date Value Ref Range Status   01/13/2012 >60 >60 mL/min Final     Lab Results   Component Value Date    WBC 5.92 12/07/2022    HGB 14.3 12/07/2022    HCT 40.3 12/07/2022    MCV 93 12/07/2022     12/07/2022     Lab Results   Component Value Date    CHOL 185 08/06/2022     Lab Results   Component Value Date    HDL 47 08/06/2022     Lab Results   Component Value Date    LDLCALC 124.6 08/06/2022     Lab Results   Component Value Date    TRIG 67 08/06/2022     Lab Results    Component Value Date    CHOLHDL 25.4 08/06/2022     Lab Results   Component Value Date    TSH 1.470 08/06/2022       ASSESSMENT/PLAN     Bibiana Suero is a 24 y.o. female    Annual physical exam- All age and gender related screenings discussed   -     CBC Auto Differential; Future; Expected date: 05/22/2025  -     Comprehensive Metabolic Panel; Future; Expected date: 05/22/2025  -     ADOLFO Screen w/Reflex; Future; Expected date: 05/22/2025  -     Hemoglobin A1C; Future; Expected date: 05/22/2025  -     Lipid Panel; Future; Expected date: 05/22/2025  -     TSH; Future; Expected date: 05/22/2025  -     Vitamin D; Future; Expected date: 05/22/2025    Anxiety disorder, unspecified type- not tolerating side effects of fluoxetine, will cross titrate lexparo. Monitor by RTC in 4 weeks   -     EScitalopram oxalate (LEXAPRO) 5 MG Tab; Take 1 tablet (5 mg total) by mouth once daily.  Dispense: 90 tablet; Refill: 3    Attention deficit hyperactivity disorder (ADHD), unspecified ADHD type- not tolerating side effects of fluoxetine, will cross titrate lexparo. Monitor by RTC in 4 weeks     Major depressive disorder with single episode, remission status unspecified- not tolerating side effects of fluoxetine, will cross titrate lexparo. Monitor by RTC in 4 weeks   -     EScitalopram oxalate (LEXAPRO) 5 MG Tab; Take 1 tablet (5 mg total) by mouth once daily.  Dispense: 90 tablet; Refill: 3    PMDD (premenstrual dysphoric disorder)- not tolerating side effects of fluoxetine, will cross titrate lexparo. Monitor by RTC in 4 weeks     Gender dysphoria- stable. Will monitor     Heart palpitations- new problem, physical exam normal. will send for Holter monitor and echo   -     Cardiac Monitor - 3-15 Day Adult (Cupid Only); Future  -     Echo; Future    Postural dizziness with presyncope- new problem, physical exam normal. will send for Holter monitor and echo   -     Cardiac Monitor - 3-15 Day Adult (Cupid Only); Future  -     Echo;  Future    Skin change  -     ADOLFO Screen w/Reflex; Future; Expected date: 05/22/2025  -     C-Reactive Protein; Future; Expected date: 05/22/2025  -     Sedimentation rate; Future; Expected date: 05/22/2025    Left upper extremity swelling  -     ADOLFO Screen w/Reflex; Future; Expected date: 05/22/2025  -     C-Reactive Protein; Future; Expected date: 05/22/2025  -     Sedimentation rate; Future; Expected date: 05/22/2025    Arthralgia, unspecified joint  -     ADOLFO Screen w/Reflex; Future; Expected date: 05/22/2025  -     C-Reactive Protein; Future; Expected date: 05/22/2025  -     Sedimentation rate; Future; Expected date: 05/22/2025           Follow up with PCP    Sheila BONILLA, JOSEFINA, FNP-c   Department of Internal Medicine - [facility]  10:24 AM         [1]   Social History  Socioeconomic History    Marital status: Single   Tobacco Use    Smoking status: Never    Smokeless tobacco: Never   Substance and Sexual Activity    Alcohol use: Yes     Alcohol/week: 1.0 standard drink of alcohol     Types: 1 Glasses of wine per week     Comment: few times per year    Drug use: Yes     Types: Marijuana, Psilocybin     Comment: rarely    Sexual activity: Not Currently     Partners: Female, Male     Birth control/protection: Abstinence   Social History Narrative    Lives with mom and step dad. 1 dog and a bearded dragon. Just added a cat.  [employer]- going to be in 12th grade     Social Drivers of Health     Financial Resource Strain: Patient Declined (2/22/2024)    Overall Financial Resource Strain (CARDIA)     Difficulty of Paying Living Expenses: Patient declined   Food Insecurity: Patient Declined (2/22/2024)    Hunger Vital Sign     Worried About Running Out of Food in the Last Year: Patient declined     Ran Out of Food in the Last Year: Patient declined   Transportation Needs: Patient Declined (2/22/2024)    PRAPARE - Transportation     Lack of Transportation (Medical): Patient declined     Lack  of Transportation (Non-Medical): Patient declined   Physical Activity: Insufficiently Active (2/22/2024)    Exercise Vital Sign     Days of Exercise per Week: 2 days     Minutes of Exercise per Session: 60 min   Stress: Stress Concern Present (2/22/2024)    [employer] - Occupational Stress Questionnaire     Feeling of Stress : To some extent   Housing Stability: Unknown (2/22/2024)    Housing Stability Vital Sign     Unable to Pay for Housing in the Last Year: Patient declined     Number of Places Lived in the Last Year: 1     Unstable Housing in the Last Year: No   [2]   Social History  Tobacco Use   Smoking Status Never   Smokeless Tobacco Never

## 2025-05-24 ENCOUNTER — PATIENT MESSAGE (OUTPATIENT)
Dept: INTERNAL MEDICINE | Facility: CLINIC | Age: 25
End: 2025-05-24
Payer: MEDICAID

## 2025-05-27 ENCOUNTER — LAB VISIT (OUTPATIENT)
Dept: LAB | Facility: HOSPITAL | Age: 25
End: 2025-05-27
Attending: NURSE PRACTITIONER
Payer: MEDICAID

## 2025-05-27 ENCOUNTER — TELEPHONE (OUTPATIENT)
Dept: INTERNAL MEDICINE | Facility: CLINIC | Age: 25
End: 2025-05-27
Payer: MEDICAID

## 2025-05-27 DIAGNOSIS — R00.2 HEART PALPITATIONS: Primary | ICD-10-CM

## 2025-05-27 DIAGNOSIS — R55 POSTURAL DIZZINESS WITH PRESYNCOPE: ICD-10-CM

## 2025-05-27 DIAGNOSIS — M25.50 ARTHRALGIA, UNSPECIFIED JOINT: ICD-10-CM

## 2025-05-27 DIAGNOSIS — R42 POSTURAL DIZZINESS WITH PRESYNCOPE: ICD-10-CM

## 2025-05-27 DIAGNOSIS — R23.9 SKIN CHANGE: ICD-10-CM

## 2025-05-27 DIAGNOSIS — Z00.00 ANNUAL PHYSICAL EXAM: ICD-10-CM

## 2025-05-27 DIAGNOSIS — M79.89 LEFT UPPER EXTREMITY SWELLING: ICD-10-CM

## 2025-05-27 LAB
25(OH)D3+25(OH)D2 SERPL-MCNC: 29 NG/ML (ref 30–96)
ABSOLUTE EOSINOPHIL (OHS): 0.31 K/UL
ABSOLUTE MONOCYTE (OHS): 0.43 K/UL (ref 0.3–1)
ABSOLUTE NEUTROPHIL COUNT (OHS): 2.74 K/UL (ref 1.8–7.7)
ALBUMIN SERPL BCP-MCNC: 4.1 G/DL (ref 3.5–5.2)
ALP SERPL-CCNC: 68 UNIT/L (ref 40–150)
ALT SERPL W/O P-5'-P-CCNC: 10 UNIT/L (ref 10–44)
ANION GAP (OHS): 10 MMOL/L (ref 8–16)
AST SERPL-CCNC: 13 UNIT/L (ref 11–45)
BASOPHILS # BLD AUTO: 0.04 K/UL
BASOPHILS NFR BLD AUTO: 0.7 %
BILIRUB SERPL-MCNC: 0.6 MG/DL (ref 0.1–1)
BUN SERPL-MCNC: 9 MG/DL (ref 6–20)
CALCIUM SERPL-MCNC: 9.1 MG/DL (ref 8.7–10.5)
CHLORIDE SERPL-SCNC: 108 MMOL/L (ref 95–110)
CHOLEST SERPL-MCNC: 189 MG/DL (ref 120–199)
CHOLEST/HDLC SERPL: 4.1 {RATIO} (ref 2–5)
CO2 SERPL-SCNC: 22 MMOL/L (ref 23–29)
CREAT SERPL-MCNC: 0.7 MG/DL (ref 0.5–1.4)
CRP SERPL-MCNC: 4.7 MG/L
EAG (OHS): 94 MG/DL (ref 68–131)
ERYTHROCYTE [DISTWIDTH] IN BLOOD BY AUTOMATED COUNT: 11.3 % (ref 11.5–14.5)
ERYTHROCYTE [SEDIMENTATION RATE] IN BLOOD BY PHOTOMETRIC METHOD: 5 MM/HR
GFR SERPLBLD CREATININE-BSD FMLA CKD-EPI: >60 ML/MIN/1.73/M2
GLUCOSE SERPL-MCNC: 94 MG/DL (ref 70–110)
HBA1C MFR BLD: 4.9 % (ref 4–5.6)
HCT VFR BLD AUTO: 42.9 % (ref 37–48.5)
HDLC SERPL-MCNC: 46 MG/DL (ref 40–75)
HDLC SERPL: 24.3 % (ref 20–50)
HGB BLD-MCNC: 14.4 GM/DL (ref 12–16)
IMM GRANULOCYTES # BLD AUTO: 0.02 K/UL (ref 0–0.04)
IMM GRANULOCYTES NFR BLD AUTO: 0.4 % (ref 0–0.5)
LDLC SERPL CALC-MCNC: 128.2 MG/DL (ref 63–159)
LYMPHOCYTES # BLD AUTO: 1.86 K/UL (ref 1–4.8)
MCH RBC QN AUTO: 31.3 PG (ref 27–31)
MCHC RBC AUTO-ENTMCNC: 33.6 G/DL (ref 32–36)
MCV RBC AUTO: 93 FL (ref 82–98)
NONHDLC SERPL-MCNC: 143 MG/DL
NUCLEATED RBC (/100WBC) (OHS): 0 /100 WBC
PLATELET # BLD AUTO: 294 K/UL (ref 150–450)
PMV BLD AUTO: 9.2 FL (ref 9.2–12.9)
POTASSIUM SERPL-SCNC: 4.1 MMOL/L (ref 3.5–5.1)
PROT SERPL-MCNC: 7.4 GM/DL (ref 6–8.4)
RBC # BLD AUTO: 4.6 M/UL (ref 4–5.4)
RELATIVE EOSINOPHIL (OHS): 5.7 %
RELATIVE LYMPHOCYTE (OHS): 34.4 % (ref 18–48)
RELATIVE MONOCYTE (OHS): 8 % (ref 4–15)
RELATIVE NEUTROPHIL (OHS): 50.8 % (ref 38–73)
SODIUM SERPL-SCNC: 140 MMOL/L (ref 136–145)
TRIGL SERPL-MCNC: 74 MG/DL (ref 30–150)
TSH SERPL-ACNC: 0.93 UIU/ML (ref 0.4–4)
WBC # BLD AUTO: 5.4 K/UL (ref 3.9–12.7)

## 2025-05-27 PROCEDURE — 83036 HEMOGLOBIN GLYCOSYLATED A1C: CPT

## 2025-05-27 PROCEDURE — 84443 ASSAY THYROID STIM HORMONE: CPT

## 2025-05-27 PROCEDURE — 82306 VITAMIN D 25 HYDROXY: CPT

## 2025-05-27 PROCEDURE — 85025 COMPLETE CBC W/AUTO DIFF WBC: CPT

## 2025-05-27 PROCEDURE — 80053 COMPREHEN METABOLIC PANEL: CPT

## 2025-05-27 PROCEDURE — 86038 ANTINUCLEAR ANTIBODIES: CPT

## 2025-05-27 PROCEDURE — 80061 LIPID PANEL: CPT

## 2025-05-27 PROCEDURE — 86140 C-REACTIVE PROTEIN: CPT

## 2025-05-27 PROCEDURE — 85652 RBC SED RATE AUTOMATED: CPT

## 2025-05-27 PROCEDURE — 36415 COLL VENOUS BLD VENIPUNCTURE: CPT

## 2025-05-27 NOTE — TELEPHONE ENCOUNTER
----- Message from Tech [place] sent at 5/23/2025 12:11 PM CDT -----  Regarding: Order correction  Good Morning, the patient mentioned above has been scheduled for the extended Holter monitoring. Currently, this kind of monitoring is available solely through EP. If you could kindly adjust the order to a 24-hour, 48-hour Holter, or 30-day monitor so the patient can obtain the necessary care. Thank you!

## 2025-05-28 ENCOUNTER — RESULTS FOLLOW-UP (OUTPATIENT)
Dept: INTERNAL MEDICINE | Facility: CLINIC | Age: 25
End: 2025-05-28

## 2025-05-28 ENCOUNTER — CLINICAL SUPPORT (OUTPATIENT)
Dept: CARDIOLOGY | Facility: HOSPITAL | Age: 25
End: 2025-05-28
Attending: NURSE PRACTITIONER
Payer: MEDICAID

## 2025-05-28 DIAGNOSIS — R42 POSTURAL DIZZINESS WITH PRESYNCOPE: ICD-10-CM

## 2025-05-28 DIAGNOSIS — R55 POSTURAL DIZZINESS WITH PRESYNCOPE: ICD-10-CM

## 2025-05-28 DIAGNOSIS — R00.2 HEART PALPITATIONS: ICD-10-CM

## 2025-05-28 LAB — ANA (OHS): NORMAL

## 2025-05-28 PROCEDURE — 93271 ECG/MONITORING AND ANALYSIS: CPT

## 2025-07-18 ENCOUNTER — LAB VISIT (OUTPATIENT)
Dept: LAB | Facility: HOSPITAL | Age: 25
End: 2025-07-18
Payer: MEDICAID

## 2025-07-18 ENCOUNTER — OFFICE VISIT (OUTPATIENT)
Dept: INTERNAL MEDICINE | Facility: CLINIC | Age: 25
End: 2025-07-18
Payer: MEDICAID

## 2025-07-18 VITALS
HEART RATE: 71 BPM | WEIGHT: 165.38 LBS | SYSTOLIC BLOOD PRESSURE: 90 MMHG | BODY MASS INDEX: 22.4 KG/M2 | DIASTOLIC BLOOD PRESSURE: 62 MMHG | HEIGHT: 72 IN | OXYGEN SATURATION: 97 %

## 2025-07-18 DIAGNOSIS — M25.512 ACUTE PAIN OF LEFT SHOULDER: ICD-10-CM
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
## 2025-07-18 DIAGNOSIS — F32.9 MAJOR DEPRESSIVE DISORDER WITH SINGLE EPISODE, REMISSION STATUS UNSPECIFIED: ICD-10-CM

## 2025-07-18 DIAGNOSIS — F90.9 ATTENTION DEFICIT HYPERACTIVITY DISORDER (ADHD), UNSPECIFIED ADHD TYPE: ICD-10-CM

## 2025-07-18 DIAGNOSIS — F64.9 GENDER DYSPHORIA: ICD-10-CM

## 2025-07-18 DIAGNOSIS — F32.81 PMDD (PREMENSTRUAL DYSPHORIC DISORDER): ICD-10-CM

## 2025-07-18 DIAGNOSIS — R59.0 AXILLARY LYMPHADENOPATHY: ICD-10-CM

## 2025-07-18 DIAGNOSIS — J06.9 UPPER RESPIRATORY TRACT INFECTION, UNSPECIFIED TYPE: ICD-10-CM

## 2025-07-18 DIAGNOSIS — F41.9 ANXIETY DISORDER, UNSPECIFIED TYPE: Primary | ICD-10-CM

## 2025-07-18 PROCEDURE — 86611 BARTONELLA ANTIBODY: CPT | Mod: 59

## 2025-07-18 PROCEDURE — 99213 OFFICE O/P EST LOW 20 MIN: CPT | Mod: PBBFAC | Performed by: NURSE PRACTITIONER

## 2025-07-18 PROCEDURE — 36415 COLL VENOUS BLD VENIPUNCTURE: CPT

## 2025-07-18 PROCEDURE — 99999 PR PBB SHADOW E&M-EST. PATIENT-LVL III: CPT | Mod: PBBFAC,,, | Performed by: NURSE PRACTITIONER

## 2025-07-18 RX ORDER — AZITHROMYCIN 250 MG/1
TABLET, FILM COATED ORAL
Qty: 6 TABLET | Refills: 0 | Status: SHIPPED | OUTPATIENT
Start: 2025-07-18 | End: 2025-07-23

## 2025-07-18 NOTE — PROGRESS NOTES
"INTERNAL MEDICINE PROGRESS NOTE    CHIEF COMPLAINT     Chief Complaint   Patient presents with    Follow-up     2 mt        [facility] [place]     Bibiana Suero is a 24 y.o. female with PMDD, JERMAN, depression, and joint pain who presents for a 2 month follow up visit today.    Pt seen 5/22/2025 for annual - at this visit c/o dizziness and trouble with balance   Echo and cardiac event monitor ordered   There were 19 patient triggered episodes. The corresponding rhythm to the patient reported event was normal sinus rhythm.   Echo - not covered by insurance     Also c/o joint pain - autoimmune work negative     PMDD/JERMAN - stopped fluoxetine and started lexparo 5mg   Reports her libido has improved  Weird dreams are stable   Mood is "lifted"   Still having some anxiety with one panic attack     Left shoulder pain - worse with internal and external rotation   Some repetitive mvmts with the left arm           Past Medical History:  Past Medical History:   Diagnosis Date    ADHD (attention deficit hyperactivity disorder)     Allergy     Anxiety     Depression     H/O wisdom tooth extraction     Other infectious mononucleosis without complication        Home Medications:  Prior to Admission medications    Medication Sig Start Date End Date Taking? Authorizing Provider   acetaminophen (TYLENOL) 500 MG tablet Take 2 tablets (1,000 mg total) by mouth every 8 (eight) hours. 12/9/22  Yes Allen Loyd, DO   ergocalciferol (ERGOCALCIFEROL) 50,000 unit Cap TAKE 1 CAPSULE BY MOUTH ONE TIME PER WEEK 10/27/22  Yes Sheila Luis, NP   EScitalopram oxalate (LEXAPRO) 5 MG Tab Take 1 tablet (5 mg total) by mouth once daily. 5/22/25 5/22/26 Yes Sheila Luis NP   ferrous sulfate (IRON ORAL) Take by mouth once daily.   Yes Provider, Historical   ibuprofen (ADVIL,MOTRIN) 800 MG tablet Take 1 tablet (800 mg total) by mouth 3 (three) times daily. 12/9/22  Yes Allen Loyd, DO   Lactobacillus acidophilus (PROBIOTIC ORAL) " "Take by mouth once daily.   Yes Provider, Historical   psyllium seed, with sugar, (FIBER ORAL) Take by mouth once daily.   Yes Provider, Historical   UNABLE TO FIND Take by mouth 2 (two) times a day. Melaleuca Vitality   Yes Provider, Historical   azelastine (OPTIVAR) 0.05 % ophthalmic solution Place 1 drop into both eyes 2 (two) times daily.  Patient not taking: Reported on 7/18/2025 5/19/25 5/19/26  Lizette Crum FNP-C   moxifloxacin (VIGAMOX) 0.5 % ophthalmic solution Place 1 drop into both eyes 3 (three) times daily.  Patient not taking: Reported on 7/18/2025 5/19/25   Lizette Crum FNP-C       Review of Systems:  Review of Systems   Constitutional:  Negative for chills and fever.   HENT:  Positive for congestion, sinus pressure, sinus pain and sore throat.    Respiratory:  Positive for cough. Negative for shortness of breath and wheezing.    Cardiovascular:  Negative for chest pain and palpitations.   Gastrointestinal:  Negative for abdominal pain, constipation, diarrhea, nausea and vomiting.   Musculoskeletal:  Positive for myalgias.   Skin:  Negative for rash.   Neurological:  Negative for headaches.   Psychiatric/Behavioral:  Negative for dysphoric mood and sleep disturbance. The patient is not nervous/anxious.        Health Maintainence:   Immunizations:  Health Maintenance         Date Due Completion Date    COVID-19 Vaccine (4 - 2024-25 season) 09/01/2024 10/28/2022    Influenza Vaccine (1) 09/01/2025 11/5/2022    TETANUS VACCINE 06/18/2033 6/18/2023    RSV Vaccine (Age 60+ and Pregnant patients) (1 - 1-dose 75+ series) 09/14/2077 ---             PHYSICAL EXAM     BP 90/62 (BP Location: Left arm, Patient Position: Sitting)   Pulse 71   Ht 6' 2" (1.88 m)   Wt 75 kg (165 lb 5.5 oz)   LMP 06/23/2025 (Exact Date)   SpO2 97%   BMI 21.23 kg/m²     Physical Exam  Vitals reviewed.   Constitutional:       Appearance: She is well-developed.   HENT:      Head: Normocephalic.      Right Ear: External " ear normal.      Left Ear: External ear normal.      Nose: Nose normal.      Mouth/Throat:      Pharynx: No oropharyngeal exudate.   Eyes:      Pupils: Pupils are equal, round, and reactive to light.   Neck:      Thyroid: No thyromegaly.      Vascular: No JVD.      Trachea: No tracheal deviation.   Cardiovascular:      Rate and Rhythm: Normal rate and regular rhythm.      Heart sounds: Normal heart sounds. No murmur heard.     No friction rub. No gallop.   Pulmonary:      Effort: Pulmonary effort is normal. No respiratory distress.      Breath sounds: Normal breath sounds. No wheezing or rales.   Abdominal:      General: Bowel sounds are normal. There is no distension.      Palpations: Abdomen is soft.      Tenderness: There is no abdominal tenderness.   Musculoskeletal:         General: No tenderness. Normal range of motion.      Cervical back: Neck supple.   Lymphadenopathy:      Cervical: No cervical adenopathy.      Upper Body:      Left upper body: Axillary adenopathy present.   Skin:     General: Skin is warm and dry.      Findings: No rash.   Neurological:      Mental Status: She is alert and oriented to person, place, and time.   Psychiatric:         Behavior: Behavior normal.         LABS     Lab Results   Component Value Date    HGBA1C 4.9 05/27/2025     CMP  Sodium   Date Value Ref Range Status   05/27/2025 140 136 - 145 mmol/L Final   12/07/2022 140 136 - 145 mmol/L Final     Potassium   Date Value Ref Range Status   05/27/2025 4.1 3.5 - 5.1 mmol/L Final   12/07/2022 4.2 3.5 - 5.1 mmol/L Final     Chloride   Date Value Ref Range Status   05/27/2025 108 95 - 110 mmol/L Final   12/07/2022 110 95 - 110 mmol/L Final     CO2   Date Value Ref Range Status   05/27/2025 22 (L) 23 - 29 mmol/L Final   12/07/2022 22 (L) 23 - 29 mmol/L Final     Glucose   Date Value Ref Range Status   05/27/2025 94 70 - 110 mg/dL Final   12/07/2022 108 70 - 110 mg/dL Final     BUN   Date Value Ref Range Status   05/27/2025 9 6 - 20  mg/dL Final     Creatinine   Date Value Ref Range Status   05/27/2025 0.7 0.5 - 1.4 mg/dL Final     Calcium   Date Value Ref Range Status   05/27/2025 9.1 8.7 - 10.5 mg/dL Final   12/07/2022 9.5 8.7 - 10.5 mg/dL Final     Protein Total   Date Value Ref Range Status   05/27/2025 7.4 6.0 - 8.4 gm/dL Final     Total Protein   Date Value Ref Range Status   12/07/2022 7.1 6.0 - 8.4 g/dL Final     Albumin   Date Value Ref Range Status   05/27/2025 4.1 3.5 - 5.2 g/dL Final   12/07/2022 4.5 3.5 - 5.2 g/dL Final     Total Bilirubin   Date Value Ref Range Status   12/07/2022 0.6 0.1 - 1.0 mg/dL Final     Comment:     For infants and newborns, interpretation of results should be based  on gestational age, weight and in agreement with clinical  observations.    Premature Infant recommended reference ranges:  Up to 24 hours.............<8.0 mg/dL  Up to 48 hours............<12.0 mg/dL  3-5 days..................<15.0 mg/dL  6-29 days.................<15.0 mg/dL       Bilirubin Total   Date Value Ref Range Status   05/27/2025 0.6 0.1 - 1.0 mg/dL Final     Comment:     For infants and newborns, interpretation of results should be based   on gestational age, weight and in agreement with clinical   observations.    Premature Infant recommended reference ranges:   0-24 hours:  <8.0 mg/dL   24-48 hours: <12.0 mg/dL   3-5 days:    <15.0 mg/dL   6-29 days:   <15.0 mg/dL     Alkaline Phosphatase   Date Value Ref Range Status   12/07/2022 62 55 - 135 U/L Final     ALP   Date Value Ref Range Status   05/27/2025 68 40 - 150 unit/L Final     AST   Date Value Ref Range Status   05/27/2025 13 11 - 45 unit/L Final   12/07/2022 11 10 - 40 U/L Final     ALT   Date Value Ref Range Status   05/27/2025 10 10 - 44 unit/L Final   12/07/2022 13 10 - 44 U/L Final     Anion Gap   Date Value Ref Range Status   05/27/2025 10 8 - 16 mmol/L Final     eGFR if    Date Value Ref Range Status   01/13/2012 >60 >60 mL/min Final     Comment:      Estimated glomerular filtration rate (eGFR) is normalized to an  average body surface area of 1.73 square meters.  The calculation  used to obtain the eGFR is the adjusted MDRD equation, which factors  patient sex, age, race, and creatinine result.  Since race is unknown  in our information system, the eGFR values for -American  and Non--American patients are given for each creatinine  result.     eGFR if non    Date Value Ref Range Status   01/13/2012 >60 >60 mL/min Final     Lab Results   Component Value Date    WBC 5.40 05/27/2025    HGB 14.4 05/27/2025    HCT 42.9 05/27/2025    MCV 93 05/27/2025     05/27/2025     Lab Results   Component Value Date    CHOL 189 05/27/2025    CHOL 185 08/06/2022     Lab Results   Component Value Date    HDL 46 05/27/2025    HDL 47 08/06/2022     Lab Results   Component Value Date    LDLCALC 128.2 05/27/2025    LDLCALC 124.6 08/06/2022     Lab Results   Component Value Date    TRIG 74 05/27/2025    TRIG 67 08/06/2022     Lab Results   Component Value Date    CHOLHDL 24.3 05/27/2025    CHOLHDL 25.4 08/06/2022     Lab Results   Component Value Date    TSH 0.930 05/27/2025       ASSESSMENT/PLAN     Bibiana Suero is a 24 y.o. female     Anxiety disorder, unspecified type- stable will cont lexparo 5mg     Attention deficit hyperactivity disorder (ADHD), unspecified ADHD type- stable will cont lexparo 5mg     Major depressive disorder with single episode, remission status unspecified- stable will cont lexparo 5mg     PMDD (premenstrual dysphoric disorder)- stable will cont lexparo 5mg     Gender dysphoria    Upper respiratory tract infection, unspecified type- stable. Will cont tylenol and ibuprofen as needed and may use mucinex D or DM     Acute pain of left shoulder/Axillary lymphadenopathy  -     [facility] Anitbody Panel; Future; Expected date: 07/18/2025  -     azithromycin (Z-JOSEE) 250 MG tablet; Take 2 tablets by mouth on day 1; Take 1 tablet by  mouth on days 2-5  Dispense: 6 tablet; Refill: 0    Cat scratch  -     [facility]artonella Anitbody Panel; Future; Expected date: 07/18/2025  -     azithromycin (Z-JOSEE) 250 MG tablet; Take 2 tablets by mouth on day 1; Take 1 tablet by mouth on days 2-5  Dispense: 6 tablet; Refill: 0           Follow up with PCP    Patient education provided from [facility]. Patient was counseled on when and how to seek emergent care.       Sheila BONILLA, JOSEFINA, FNP-c   Department of Internal Medicine - [facility]  10:46 AM

## 2025-07-20 LAB
B HENSELAE IGG TITR SER IF: NORMAL TITER
B HENSELAE IGM TITR SER IF: NORMAL TITER
B QUINTANA IGG TITR SER IF: NORMAL TITER
B QUINTANA IGM TITR SER IF: NORMAL TITER

## (undated) DEVICE — TRAY FOLEY 16FR INFECTION CONT

## (undated) DEVICE — ELECTRODE BLD EXT INSUL 1

## (undated) DEVICE — GLOVE BIOGEL SKINSENSE PI 7.0

## (undated) DEVICE — GOWN NONREINF SET-IN SLV XL

## (undated) DEVICE — SPONGE LAP 18X18 PREWASHED

## (undated) DEVICE — DRAPE THREE-QTR REINF 53X77IN

## (undated) DEVICE — SUT STRATAFIX PGAPCL 3 FS-1

## (undated) DEVICE — SUT STRATAFIX PGAPCL 4 FS-2

## (undated) DEVICE — PENCIL ELECTROSURG HOLST W/BLD

## (undated) DEVICE — POSITIONER IV ARMBOARD FOAM

## (undated) DEVICE — TOWEL OR DISP STRL BLUE 4/PK

## (undated) DEVICE — HOLDER DRAIN POUCH PINK

## (undated) DEVICE — SUT PROLENE 4-0 MONO 18IN

## (undated) DEVICE — Device

## (undated) DEVICE — GOWN SMART IMP BREATHABLE XXLG

## (undated) DEVICE — SOL NS 1000CC

## (undated) DEVICE — SYR ONLY LUER LOCK 20CC

## (undated) DEVICE — BRA CLASSIC COMFORM BLK SZ 36

## (undated) DEVICE — SYS PRINEO SKIN CLOSURE

## (undated) DEVICE — ELECTRODE REM PLYHSV RETURN 9

## (undated) DEVICE — CLOSURE SKIN STERI STRIP 1/2X4

## (undated) DEVICE — STAPLER SKIN ROTATING HEAD

## (undated) DEVICE — POSITIONER HEEL FOAM CONVOLTD

## (undated) DEVICE — SKIN MARKER DEVON 160